# Patient Record
Sex: MALE | Race: WHITE | NOT HISPANIC OR LATINO | Employment: FULL TIME | URBAN - METROPOLITAN AREA
[De-identification: names, ages, dates, MRNs, and addresses within clinical notes are randomized per-mention and may not be internally consistent; named-entity substitution may affect disease eponyms.]

---

## 2017-01-04 ENCOUNTER — HOSPITAL ENCOUNTER (EMERGENCY)
Facility: CLINIC | Age: 27
Discharge: HOME | End: 2017-01-04
Attending: EMERGENCY MEDICINE | Admitting: EMERGENCY MEDICINE

## 2017-01-04 VITALS
TEMPERATURE: 97.5 F | WEIGHT: 310 LBS | DIASTOLIC BLOOD PRESSURE: 92 MMHG | BODY MASS INDEX: 46.98 KG/M2 | SYSTOLIC BLOOD PRESSURE: 153 MMHG | OXYGEN SATURATION: 98 % | RESPIRATION RATE: 16 BRPM | HEIGHT: 68 IN | HEART RATE: 106 BPM

## 2017-01-04 DIAGNOSIS — F17.200 CURRENT SMOKER: ICD-10-CM

## 2017-01-04 DIAGNOSIS — R00.0 SINUS TACHYCARDIA: ICD-10-CM

## 2017-01-04 DIAGNOSIS — R07.9 ACUTE CHEST PAIN: ICD-10-CM

## 2017-01-04 DIAGNOSIS — K62.5 RECTAL BLEEDING: ICD-10-CM

## 2017-01-04 PROCEDURE — 93005 ELECTROCARDIOGRAM TRACING: CPT

## 2017-01-04 PROCEDURE — 99284 EMERGENCY DEPT VISIT MOD MDM: CPT

## 2017-01-04 ASSESSMENT — ENCOUNTER SYMPTOMS
FEVER: 0
RHINORRHEA: 1
ABDOMINAL PAIN: 0
COUGH: 1
BLOOD IN STOOL: 1

## 2017-01-04 NOTE — ED NOTES
Patient complaining of mid epigastric, midsternal chest pain for two to three days.  Worse with movement or deep breath that comes and goes.  Also having bright red blood in stool.     ABCs intact.  Alert and oriented x 3.

## 2017-01-04 NOTE — LETTER
[facility] EMERGENCY DEPARTMENT  [address]  [facility] [address]  [phone]    Luan Haynes  [address] [address]  [phone] (home) [phone] (work)    : 1990      To Whom it may concern:    Luan Haynes was seen in our Emergency Department today, 2017.  He should have close follow-up for reassessment.      Sincerely,      Thelma Kaye MD

## 2017-01-04 NOTE — ED PROVIDER NOTES
"  History     Chief Complaint:  Chest Pain and Rectal Problem    HPI   Luan Haynes is a 26 year old male who presents to the emergency department today for evaluation of chest pain and a rectal problem. The patient reports that he first noticed some chest discomfort about 36 hours ago while he was sitting on the couch and playing with his girls. The patient reports that there is not specific laterality to his discomfort but just that it is in the general vicinity of his chest (points across anterior chest). He reports some exacerbation of this discomfort with deep breathing and he also reports that recently he has been coughing up brown chunks and has also had a runny nose. He reports that he is a current smoker and that sometimes when he gets coughing fits, things start to go black and \"fuzzy\" and he starts to feel tingly and his body goes numb. The patient denies any fevers, abdominal pain, or current prescription or other medications. The patient also reports some blood in his stool that he has noticed intermittently over the past several months. The patient reports that he thinks it is just a hemorrhoid and that it is not painful. He reports that he only notices the blood with bowel movements and it is not present at any other times.     Allergies:  No Known Drug Allergies    Medications:    Acetaminophen-codeine  Neurontin     Past Medical History:    Cardiovascular screening, LDL goal less than 160  Foot injury     Past Surgical History:    No past surgical history on file.    Family History:    Maternal Grandfather: HTN, Lipids, Heart Disease, Diabetes   Father: Lung cancer    Social History:  Smoking Status: Current Every Day Smoker -- 1 pack per day  Smokeless Tobacco: Current User  Alcohol Use: Positive  Marital Status:  Single [1]     Review of Systems   Constitutional: Negative for fever.   HENT: Positive for rhinorrhea.    Respiratory: Positive for cough.    Cardiovascular:        Chest discomfort " "  Gastrointestinal: Positive for blood in stool. Negative for abdominal pain.   All other systems reviewed and are negative.    Physical Exam   Vitals:  Patient Vitals for the past 24 hrs:   BP Temp Temp src Pulse Resp SpO2 Height Weight   01/04/17 1525 (!) 153/92 mmHg 97.5  F (36.4  C) Temporal 106 16 98 % 1.727 m (5' 8\") (!) 140.615 kg (310 lb)      Physical Exam  Eyes:  Sclera white; Pupils are equal and round  ENT:    External ears and nares normal, no goiter  CV:  Rate as above with regular rhythm, No murmur     No CW tenderness, no rash    No BLE edema  Resp:  Breath sounds clear and equal bilaterally  GI:  Abdomen is soft, non-tender, non-distended    No rebound tenderness or peritoneal features    Rectal: no external hemorrhoids, no blood  MS:  Moves all extremities  Skin:  Warm and dry  Neuro: Speech is normal and fluent. No apparent deficit.      Emergency Department Course     ECG:  ECG taken at 1520, ECG read at 1625  Normal sinus rhythm  Normal ECG  Rate 99 bpm. WY interval 158 ms. QRS duration 94 ms. QT/QTc 342/438 ms. P-R-T axes 76 14 34.    Emergency Department Course:  Nursing notes and vitals reviewed.  I performed an exam of the patient as documented above.   I discussed the treatment plan with the patient. The is to return to the emergency department if their symptoms persist, worsen, if new symptoms arise or if there is any concern.  All questions were answered.  I personally reviewed the EKG results with the patient and answered all related questions prior to his leaving despite not having a diagnosis..  Impression & Plan      Medical Decision Making:  Luan Haynes is her for evaluation of two areas of concern, the acute concern is chest pain with associated shortness of breath and a cough productive of what may be hemoptysis. This combination is very concerning for pulmonary embolism, especially given his associated tachycardia. He is not PERC negative and we discussed d-dimer testing prior " to obtaining imaging. The differential also includes ACS, dysrhythmia, pneumonia, bronchitis, PTx, pleural effusion. The patient has a strong family history of cardiac disease and is a current smoker. Smoking cessation is advised. He is not interested in nicotine replacement at this time. EKG demonstrated no ischemic changes but he understands that blood work would be needed to rule out NSTEMI which is also a heart attack.  Prior to blood work being drawn the patient stated that he needed to leave to get his kids. He understands that workup is not completed and diagnosis cannot be provided at this time. Several of the diagnoses under consideration can have life threatening complications. He understands the potential for disability and death related to these.     He has also had intermittent blood in the stools. He does not have associated abdominal pain to suggest that this is secondary to ulcerative colitis or inflammatory bowel disease. There is no abdominal tenderness to suggest appendicitis or diverticulitis. There are no external hemorrhoids on exam but based on his description of symptoms, I suspect likely internal hemorrhoids. I was going to check his hemoglobin to make sure that he has not dropped his blood count secondary to this and if it was unstable I was going to refer to colorectal for outpatient follow up. He understands this evaluation is also not complete.  Follow up information provided.    He understands that he can come back immediately for reassessment at any time.       Diagnosis:    ICD-10-CM    1. Acute chest pain R07.9    2. Sinus tachycardia R00.0    3. Rectal bleeding K62.5    4. Current smoker F17.200      Disposition:   The patient is discharged to home.    Scribe Disclosure:  I, Seth Boyd, am serving as a scribe at 9:32 PM on 12/19/2016 to document services personally performed by No att. providers found, based on my observations and the provider's statements to me.    1/4/2017    [facility] EMERGENCY DEPARTMENT        Thelma Kaye MD  01/04/17 2170

## 2017-01-04 NOTE — ED NOTES
This nurse went to start IV and draw labs.  Patient stated needs to leave in 50 minutes.  Was told testing would not be done by that time.  Patient stated wants to leave AMA. Dr. Kaye notified and spoke with patient.

## 2017-01-04 NOTE — ED AVS SNAPSHOT
[facility] Emergency Department    [address]    [facility] [address]    Phone:  [phone]    Fax:  [phone]                                       Luan Haynes   MRN: [id number]    Department:  [facility] Emergency Department   Date of Visit:  1/4/2017           After Visit Summary Signature Page     I have received my discharge instructions, and my questions have been answered. I have discussed any challenges I see with this plan with the nurse or doctor.    ..........................................................................................................................................  Patient/Patient Representative Signature      ..........................................................................................................................................  Patient Representative Print Name and Relationship to Patient    ..................................................               ................................................  Date                                            Time    ..........................................................................................................................................  Reviewed by Signature/Title    ...................................................              ..............................................  Date                                                            Time

## 2017-01-04 NOTE — DISCHARGE INSTRUCTIONS
Discharge Instructions  Chest Pain    You have been seen today for chest pain or discomfort.  At this time, your doctor has found no signs that your chest pain is due to a serious or life-threatening condition, (or you have declined more testing and/or admission to the hospital). However, sometimes there is a serious problem that does not show up right away. Your evaluation today may not be complete and you may need further testing and evaluation.     You need to follow-up with your regular doctor within 3 days.    Return to the Emergency Department if:    Your chest pain changes, gets worse, starts to happen more often, or comes with less activity.    You are short of breath.    You get very weak or tired.    You pass out or faint.    You have any new symptoms, like fever, cough, numb legs, or you cough up blood.    You have anything else that worries you.    Until you follow-up with your regular doctor please do the following:    Take one aspirin daily unless you have an allergy or are told not to by your doctor.    If a stress test appointment has been made, go to the appointment.    If you have questions, contact your regular doctor.    If your doctor today has told you to follow-up with your regular doctor, it is very important that you make an appointment with your clinic and go to the appointment.  If you do not follow-up with your primary doctor, it may result in missing an important development which could result in permanent injury or disability and/or lasting pain.  If there is any problem keeping your appointment, call your doctor or return to the Emergency Department.    If you were given a prescription for medicine here today, be sure to read all of the information (including the package insert) that comes with your prescription.  This will include important information about the medicine, its side effects, and any warnings that you need to know about.  The pharmacist who fills the prescription can  provide more information and answer questions you may have about the medicine.  If you have questions or concerns that the pharmacist cannot address, please call or return to the Emergency Department.       Remember that you can always come back to the Emergency Department if you are not able to see your regular doctor in the amount of time listed above, if you get any new symptoms, or if there is anything that worries you.

## 2017-01-04 NOTE — ED AVS SNAPSHOT
[facility] Emergency Department    [address]    [facility] [address]    Phone:  [phone]    Fax:  [phone]                                       Luan Haynes   MRN: [id number]    Department:  [facility] Emergency Department   Date of Visit:  1/4/2017           Patient Information     Date Of Birth          1990        Your diagnoses for this visit were:     Acute chest pain     Sinus tachycardia     Rectal bleeding     Current smoker        You were seen by Thelma Kaye MD.      Follow-up Information     Follow up with [facility] Emergency Department.    Specialty:  EMERGENCY MEDICINE    Why:  Immediately if symptoms worsen or change    Contact information:    [address]  [facility] [address] [phone]        Follow up with Colorectal.    Why:  For bleeding        Discharge Instructions       Discharge Instructions  Chest Pain    You have been seen today for chest pain or discomfort.  At this time, your doctor has found no signs that your chest pain is due to a serious or life-threatening condition, (or you have declined more testing and/or admission to the hospital). However, sometimes there is a serious problem that does not show up right away. Your evaluation today may not be complete and you may need further testing and evaluation.     You need to follow-up with your regular doctor within 3 days.    Return to the Emergency Department if:    Your chest pain changes, gets worse, starts to happen more often, or comes with less activity.    You are short of breath.    You get very weak or tired.    You pass out or faint.    You have any new symptoms, like fever, cough, numb legs, or you cough up blood.    You have anything else that worries you.    Until you follow-up with your regular doctor please do the following:    Take one aspirin daily unless you have an allergy or are told not to by your doctor.    If a  stress test appointment has been made, go to the appointment.    If you have questions, contact your regular doctor.    If your doctor today has told you to follow-up with your regular doctor, it is very important that you make an appointment with your clinic and go to the appointment.  If you do not follow-up with your primary doctor, it may result in missing an important development which could result in permanent injury or disability and/or lasting pain.  If there is any problem keeping your appointment, call your doctor or return to the Emergency Department.    If you were given a prescription for medicine here today, be sure to read all of the information (including the package insert) that comes with your prescription.  This will include important information about the medicine, its side effects, and any warnings that you need to know about.  The pharmacist who fills the prescription can provide more information and answer questions you may have about the medicine.  If you have questions or concerns that the pharmacist cannot address, please call or return to the Emergency Department.       Remember that you can always come back to the Emergency Department if you are not able to see your regular doctor in the amount of time listed above, if you get any new symptoms, or if there is anything that worries you.          24 Hour Appointment Hotline       To make an appointment at any [facility], call [phone] ([phone]). If you don't have a family doctor or clinic, we will help you find one. [place] clinics are conveniently located to serve the needs of you and your family.             Review of your medicines      Our records show that you are taking the medicines listed below. If these are incorrect, please call your family doctor or clinic.        Dose / Directions Last dose taken    acetaminophen-codeine 300-30 MG per tablet   Commonly known as:  TYLENOL w/CODEINE No. 3   Dose:  1-2 tablet    Quantity:  60 tablet        Take 1-2 tablets by mouth daily as needed for mild pain   Refills:  0        gabapentin 300 MG capsule   Commonly known as:  NEURONTIN   Quantity:  90 capsule        Take 1 tablet (300 mg) every night for 3-5 days, then 1 tablet twice daily for 3-5 days, then 1 tablet three times daily   Refills:  0        order for DME   Quantity:  1 Device        Equipment being ordered: walking boot, medium   Refills:  0                Procedures and tests performed during your visit     EKG 12 lead      Orders Needing Specimen Collection     Ordered          01/04/17 1622  CBC with platelets differential - STAT, Prio: STAT, Needs to be Collected     Scheduled Task Status   01/04/17 1622 Collect CBC with platelets differential Open   Order Class:  PCU Collect                01/04/17 1622  D dimer quantitative - STAT, Prio: STAT, Needs to be Collected     Scheduled Task Status   01/04/17 1622 Collect D dimer quantitative Open   Order Class:  PCU Collect                01/04/17 1622  INR - STAT, Prio: STAT, Needs to be Collected     Scheduled Task Status   01/04/17 1622 Collect INR Open   Order Class:  PCU Collect                01/04/17 1622  Comprehensive metabolic panel - STAT, Prio: STAT, Needs to be Collected     Scheduled Task Status   01/04/17 1622 Collect Comprehensive metabolic panel Open   Order Class:  PCU Collect                01/04/17 1622  Lipase - STAT, Prio: STAT, Needs to be Collected     Scheduled Task Status   01/04/17 1622 Collect Lipase Open   Order Class:  PCU Collect                01/04/17 1622  Troponin I - STAT, Prio: STAT, Needs to be Collected     Scheduled Task Status   01/04/17 1622 Collect Troponin I Open   Order Class:  PCU Collect                01/04/17 1622  TSH with free T4 reflex - STAT, Prio: STAT, Needs to be Collected     Scheduled Task Status   01/04/17 1623 Collect TSH with free T4 reflex Open   Order Class:  PCU Collect                  Pending Results     No  orders found from 1/3/2017 to 1/5/2017.            Pending Culture Results     No orders found from 1/3/2017 to 1/5/2017.             Test Results from your hospital stay            Clinical Quality Measure: Blood Pressure Screening     Your blood pressure was checked while you were in the emergency department today. The last reading we obtained was  BP: (!) 153/92 mmHg . Please read the guidelines below about what these numbers mean and what you should do about them.  If your systolic blood pressure (the top number) is less than 120 and your diastolic blood pressure (the bottom number) is less than 80, then your blood pressure is normal. There is nothing more that you need to do about it.  If your systolic blood pressure (the top number) is 120-139 or your diastolic blood pressure (the bottom number) is 80-89, your blood pressure may be higher than it should be. You should have your blood pressure rechecked within a year by a primary care provider.  If your systolic blood pressure (the top number) is 140 or greater or your diastolic blood pressure (the bottom number) is 90 or greater, you may have high blood pressure. High blood pressure is treatable, but if left untreated over time it can put you at risk for heart attack, stroke, or kidney failure. You should have your blood pressure rechecked by a primary care provider within the next 4 weeks.  If your provider in the emergency department today gave you specific instructions to follow-up with your doctor or provider even sooner than that, you should follow that instruction and not wait for up to 4 weeks for your follow-up visit.        Thank you for choosing [facility]       Thank you for choosing [facility] for your care. Our goal is always to provide you with excellent care. Hearing back from our patients is one way we can continue to improve our services. Please take a few minutes to complete the written survey that you may receive in the mail after you visit with  "us. Thank you!        [employer] Information     [employer] lets you send messages to your doctor, view your test results, renew your prescriptions, schedule appointments and more. To sign up, go to [email or web address] . Click on \"Log in\" on the left side of the screen, which will take you to the Welcome page. Then click on \"Sign up Now\" on the right side of the page.     You will be asked to enter the access code listed below, as well as some personal information. Please follow the directions to create your username and password.     Your access code is: [email or web address]  Expires: 2017  4:46 PM     Your access code will  in 90 days. If you need help or a new code, please call your [facility] clinic or [phone].        Care EveryWhere ID     This is your Care EveryWhere ID. This could be used by other organizations to access your [facility] medical records  [id number]        After Visit Summary       This is your record. Keep this with you and show to your community pharmacist(s) and doctor(s) at your next visit.                  "

## 2017-01-05 ENCOUNTER — HOSPITAL ENCOUNTER (EMERGENCY)
Facility: CLINIC | Age: 27
Discharge: HOME | End: 2017-01-05
Attending: EMERGENCY MEDICINE | Admitting: EMERGENCY MEDICINE

## 2017-01-05 ENCOUNTER — APPOINTMENT (OUTPATIENT)
Dept: GENERAL RADIOLOGY | Facility: CLINIC | Age: 27
End: 2017-01-05
Attending: EMERGENCY MEDICINE

## 2017-01-05 VITALS
DIASTOLIC BLOOD PRESSURE: 61 MMHG | WEIGHT: 310 LBS | HEART RATE: 110 BPM | RESPIRATION RATE: 12 BRPM | SYSTOLIC BLOOD PRESSURE: 115 MMHG | HEIGHT: 68 IN | BODY MASS INDEX: 46.98 KG/M2 | TEMPERATURE: 97.2 F | OXYGEN SATURATION: 97 %

## 2017-01-05 DIAGNOSIS — R09.1 PLEURISY: ICD-10-CM

## 2017-01-05 DIAGNOSIS — J20.9 ACUTE BRONCHITIS, UNSPECIFIED ORGANISM: ICD-10-CM

## 2017-01-05 LAB
ALBUMIN SERPL-MCNC: 3.8 G/DL (ref 3.4–5)
ALP SERPL-CCNC: 92 U/L (ref 40–150)
ALT SERPL W P-5'-P-CCNC: 46 U/L (ref 0–70)
ANION GAP SERPL CALCULATED.3IONS-SCNC: 9 MMOL/L (ref 3–14)
AST SERPL W P-5'-P-CCNC: 22 U/L (ref 0–45)
BASOPHILS # BLD AUTO: 0.1 10E9/L (ref 0–0.2)
BASOPHILS NFR BLD AUTO: 0.9 %
BILIRUB SERPL-MCNC: 0.7 MG/DL (ref 0.2–1.3)
BUN SERPL-MCNC: 13 MG/DL (ref 7–30)
CALCIUM SERPL-MCNC: 8.6 MG/DL (ref 8.5–10.1)
CHLORIDE SERPL-SCNC: 103 MMOL/L (ref 94–109)
CO2 SERPL-SCNC: 25 MMOL/L (ref 20–32)
CREAT SERPL-MCNC: 0.98 MG/DL (ref 0.66–1.25)
D DIMER PPP FEU-MCNC: NORMAL UG/ML FEU (ref 0–0.5)
DIFFERENTIAL METHOD BLD: ABNORMAL
EOSINOPHIL # BLD AUTO: 0.1 10E9/L (ref 0–0.7)
EOSINOPHIL NFR BLD AUTO: 1 %
ERYTHROCYTE [DISTWIDTH] IN BLOOD BY AUTOMATED COUNT: 12.3 % (ref 10–15)
GFR SERPL CREATININE-BSD FRML MDRD: ABNORMAL ML/MIN/1.7M2
GLUCOSE SERPL-MCNC: 101 MG/DL (ref 70–99)
HCT VFR BLD AUTO: 48.6 % (ref 40–53)
HGB BLD-MCNC: 17.1 G/DL (ref 13.3–17.7)
IMM GRANULOCYTES # BLD: 0.1 10E9/L (ref 0–0.4)
IMM GRANULOCYTES NFR BLD: 0.4 %
INTERPRETATION ECG - MUSE: NORMAL
INTERPRETATION ECG - MUSE: NORMAL
LIPASE SERPL-CCNC: 96 U/L (ref 73–393)
LYMPHOCYTES # BLD AUTO: 4.3 10E9/L (ref 0.8–5.3)
LYMPHOCYTES NFR BLD AUTO: 37.4 %
MCH RBC QN AUTO: 31.1 PG (ref 26.5–33)
MCHC RBC AUTO-ENTMCNC: 35.2 G/DL (ref 31.5–36.5)
MCV RBC AUTO: 88 FL (ref 78–100)
MONOCYTES # BLD AUTO: 1 10E9/L (ref 0–1.3)
MONOCYTES NFR BLD AUTO: 8.3 %
NEUTROPHILS # BLD AUTO: 6 10E9/L (ref 1.6–8.3)
NEUTROPHILS NFR BLD AUTO: 52 %
NRBC # BLD AUTO: 0 10*3/UL
NRBC BLD AUTO-RTO: 0 /100
PLATELET # BLD AUTO: 266 10E9/L (ref 150–450)
POTASSIUM SERPL-SCNC: 3.8 MMOL/L (ref 3.4–5.3)
PROT SERPL-MCNC: 7.7 G/DL (ref 6.8–8.8)
RBC # BLD AUTO: 5.5 10E12/L (ref 4.4–5.9)
SODIUM SERPL-SCNC: 137 MMOL/L (ref 133–144)
TROPONIN I SERPL-MCNC: NORMAL UG/L (ref 0–0.04)
WBC # BLD AUTO: 11.6 10E9/L (ref 4–11)

## 2017-01-05 PROCEDURE — 96361 HYDRATE IV INFUSION ADD-ON: CPT

## 2017-01-05 PROCEDURE — 85025 COMPLETE CBC W/AUTO DIFF WBC: CPT | Performed by: EMERGENCY MEDICINE

## 2017-01-05 PROCEDURE — 80053 COMPREHEN METABOLIC PANEL: CPT | Performed by: EMERGENCY MEDICINE

## 2017-01-05 PROCEDURE — 25000128 H RX IP 250 OP 636: Performed by: EMERGENCY MEDICINE

## 2017-01-05 PROCEDURE — 99285 EMERGENCY DEPT VISIT HI MDM: CPT | Mod: 25

## 2017-01-05 PROCEDURE — 83690 ASSAY OF LIPASE: CPT | Performed by: EMERGENCY MEDICINE

## 2017-01-05 PROCEDURE — 85379 FIBRIN DEGRADATION QUANT: CPT | Performed by: EMERGENCY MEDICINE

## 2017-01-05 PROCEDURE — 84484 ASSAY OF TROPONIN QUANT: CPT | Performed by: EMERGENCY MEDICINE

## 2017-01-05 PROCEDURE — 96374 THER/PROPH/DIAG INJ IV PUSH: CPT

## 2017-01-05 PROCEDURE — 71020 XR CHEST 2 VW: CPT

## 2017-01-05 PROCEDURE — 94640 AIRWAY INHALATION TREATMENT: CPT

## 2017-01-05 PROCEDURE — 40000275 ZZH STATISTIC RCP TIME EA 10 MIN

## 2017-01-05 PROCEDURE — 93005 ELECTROCARDIOGRAM TRACING: CPT

## 2017-01-05 PROCEDURE — 25000125 ZZHC RX 250: Performed by: EMERGENCY MEDICINE

## 2017-01-05 RX ORDER — KETOROLAC TROMETHAMINE 15 MG/ML
15 INJECTION, SOLUTION INTRAMUSCULAR; INTRAVENOUS ONCE
Status: COMPLETED | OUTPATIENT
Start: 2017-01-05 | End: 2017-01-05

## 2017-01-05 RX ORDER — IBUPROFEN 800 MG/1
800 TABLET, FILM COATED ORAL EVERY 8 HOURS PRN
Qty: 60 TABLET | Refills: 0 | Status: SHIPPED | OUTPATIENT
Start: 2017-01-05 | End: 2017-01-13

## 2017-01-05 RX ORDER — IPRATROPIUM BROMIDE AND ALBUTEROL SULFATE 2.5; .5 MG/3ML; MG/3ML
3 SOLUTION RESPIRATORY (INHALATION) ONCE
Status: COMPLETED | OUTPATIENT
Start: 2017-01-05 | End: 2017-01-05

## 2017-01-05 RX ORDER — BENZONATATE 200 MG/1
200 CAPSULE ORAL 3 TIMES DAILY PRN
Qty: 21 CAPSULE | Refills: 0 | Status: SHIPPED | OUTPATIENT
Start: 2017-01-05 | End: 2017-02-27

## 2017-01-05 RX ADMIN — SODIUM CHLORIDE 1000 ML: 9 INJECTION, SOLUTION INTRAVENOUS at 13:05

## 2017-01-05 RX ADMIN — IPRATROPIUM BROMIDE AND ALBUTEROL SULFATE 3 ML: .5; 3 SOLUTION RESPIRATORY (INHALATION) at 13:06

## 2017-01-05 RX ADMIN — KETOROLAC TROMETHAMINE 15 MG: 15 INJECTION, SOLUTION INTRAMUSCULAR; INTRAVENOUS at 13:06

## 2017-01-05 ASSESSMENT — ENCOUNTER SYMPTOMS
FEVER: 0
BLOOD IN STOOL: 1
COUGH: 1

## 2017-01-05 NOTE — DISCHARGE INSTRUCTIONS
Please make an appointment to follow up with your primary care provider in 3-5 days if not improving.    Discharge Instructions  Chest Pain    You have been seen today for chest pain or discomfort.  At this time, your doctor has found no signs that your chest pain is due to a serious or life-threatening condition, (or you have declined more testing and/or admission to the hospital). However, sometimes there is a serious problem that does not show up right away. Your evaluation today may not be complete and you may need further testing and evaluation.     You need to follow-up with your regular doctor within 3 days.    Return to the Emergency Department if:    Your chest pain changes, gets worse, starts to happen more often, or comes with less activity.    You are short of breath.    You get very weak or tired.    You pass out or faint.    You have any new symptoms, like fever, cough, numb legs, or you cough up blood.    You have anything else that worries you.    Until you follow-up with your regular doctor please do the following:    Take one aspirin daily unless you have an allergy or are told not to by your doctor.    If a stress test appointment has been made, go to the appointment.    If you have questions, contact your regular doctor.    If your doctor today has told you to follow-up with your regular doctor, it is very important that you make an appointment with your clinic and go to the appointment.  If you do not follow-up with your primary doctor, it may result in missing an important development which could result in permanent injury or disability and/or lasting pain.  If there is any problem keeping your appointment, call your doctor or return to the Emergency Department.    If you were given a prescription for medicine here today, be sure to read all of the information (including the package insert) that comes with your prescription.  This will include important information about the medicine, its side  effects, and any warnings that you need to know about.  The pharmacist who fills the prescription can provide more information and answer questions you may have about the medicine.  If you have questions or concerns that the pharmacist cannot address, please call or return to the Emergency Department.     Opioid Medication Information    Pain medications are among the most commonly prescribed medicines, so we are including this information for all our patients. If you did not receive pain medication or get a prescription for pain medicine, you can ignore it.     You may have been given a prescription for an opioid (narcotic) pain medicine and/or have received a pain medicine while here in the Emergency Department. These medicines can make you drowsy or impaired. You must not drive, operate dangerous equipment, or engage in any other dangerous activities while taking these medications. If you drive while taking these medications, you could be arrested for DUI, or driving under the influence. Do not drink any alcohol while you are taking these medications.     Opioid pain medications can cause addiction. If you have a history of chemical dependency of any type, you are at a higher risk of becoming addicted to pain medications.  Only take these prescribed medications to treat your pain when all other options have been tried. Take it for as short a time and as few doses as possible. Store your pain pills in a secure place, as they are frequently stolen and provide a dangerous opportunity for children or visitors in your house to start abusing these powerful medications. We will not replace any lost or stolen medicine.  As soon as your pain is better, you should flush all your remaining medication.     Many prescription pain medications contain Tylenol  (acetaminophen), including Vicodin , Tylenol #3 , Norco , Lortab , and Percocet .  You should not take any extra pills of Tylenol  if you are using these prescription  medications or you can get very sick.  Do not ever take more than 3000 mg of acetaminophen in any 24 hour period.    All opioids tend to cause constipation. Drink plenty of water and eat foods that have a lot of fiber, such as fruits, vegetables, prune juice, apple juice and high fiber cereal.  Take a laxative if you don t move your bowels at least every other day. Miralax , Milk of Magnesia, Colace , or Senna  can be used to keep you regular.      Remember that you can always come back to the Emergency Department if you are not able to see your regular doctor in the amount of time listed above, if you get any new symptoms, or if there is anything that worries you.          Acute Bronchitis  Your healthcare provider has told you that you have acute bronchitis. Bronchitis is infection or inflammation of the bronchial tubes (airways in the lungs). Normally, air moves easily in and out of the airways. Bronchitis narrows the airways, making it harder for air to flow in and out of the lungs. This causes symptoms such as shortness of breath, coughing, and wheezing. Bronchitis can be  acute  or  chronic.  Acute means the condition comes on quickly and goes away in a short time. Chronic means a condition lasts a long time and often comes back. Read on to learn more about acute bronchitis.    What causes acute bronchitis?  Acute bronchitis almost always starts as a viral respiratory infection, such as a cold or the flu. Certain factors make it more likely for a cold or flu to turn into bronchitis. These include being very young or very old or having a heart or lung problem. Cigarette smoking also makes bronchitis more likely.  When bronchitis develops, the airways become swollen. The airways may also become infected with bacteria. This is known as a secondary infection.  Diagnosing acute bronchitis  Your healthcare provider will examine you and ask about your symptoms and health history. You may also have a sputum culture to  test the fluid in your lungs. Chest X-rays may be done to look for infection in the lungs.  Treating acute bronchitis  Bronchitis usually clears up as the cold or flu goes away. You can help feel better faster by doing the following:    Take medicine as directed. You may be told to take ibuprofen or other over-the-counter medicines. These help relieve inflammation in your bronchial tubes. Your doctor may prescribe an inhaler to help open up the bronchial tubes. Most of the time, acute bronchitis is caused by a viral infection. Antibiotics are usually not prescribed for viral infections.    Drink plenty of fluids, such as water, juice, or warm soup. Fluids loosen mucus so that you can cough it up. This helps you breathe more easily. Fluids also prevent dehydration.    Make sure you get plenty of rest.    Do not smoke. Do not allow anyone else to smoke in your home.  Recovery and follow-up  Follow up with your doctor as you are told. You will likely feel better in a week or two. But a dry cough can linger beyond that time. Let your doctor know if you still have symptoms (other than a dry cough) after 2 weeks. If you re prone to getting bronchial infections, let your doctor know. And take steps to protect yourself from future infections. These steps include stopping smoking and avoiding tobacco smoke, washing your hands often, and getting a yearly flu shot.  When to call the doctor  Call the doctor if you have any of the following:    Fever of 100.4 F (38.0 C) higher    Symptoms that get worse, or new symptoms    Trouble breathing    Symptoms that don t start to improve within a week, or within 3 days of taking antibiotics     0493-7732 [employer] [address] [place] [address], [place] [address]. All rights reserved. This information is not intended as a substitute for professional medical care. Always follow your healthcare professional's instructions.          Pleurisy    If you have pleurisy, the lining  around your lungs is inflamed. This is most often due to a viral infection or pneumonia. It usually lasts for 10 to 14 days. It may cause sharp pain with breathing, coughing, sneezing, and movement. Antibiotics are usually not prescribed for this condition unless pneumonia is also present.  The following tips will help you care for your condition at home:    If symptoms are severe, rest at home for the first 2 to 3 days. When you resume activity, don't let yourself get too tired.    Do not smoke. Also avoid being exposed to secondhand smoke.    You may use over-the-counter medicines to control pain, unless another pain medicine was prescribed. (Note: If you have chronic liver or kidney disease or have ever had a stomach ulcer or gastrointestinal bleeding, talk with your healthcare provider before using these medicines. Also talk to your provider if you are taking medicine to prevent blood clots.) Aspirin should never be given to anyone younger than 18 years of age who is ill with a viral infection or fever. It may cause severe liver or brain damage.  Follow-up care  Follow up with your healthcare provider, or as advised.  When to seek medical advice  Call your healthcare provider right away if any of these occur:    Fever over 100.4 F (38 C)    Coughing up lots of colored sputum (mucus)    Redness, pain, or swelling of the leg  Call 911, or get immediate medical care  Contact emergency services right away if any of these occur:    Increasing shortness of breath    Increasing chest pain, or pain that spreads to the neck, arm, or back    Coughing up blood    3120-6146 [employer] [address] [place] [address], [place] [address]. All rights reserved. This information is not intended as a substitute for professional medical care. Always follow your healthcare professional's instructions.

## 2017-01-05 NOTE — LETTER
[facility] EMERGENCY DEPARTMENT  [address]  [facility] [address]  [phone]  Dept: [phone] 1/5/2017    Re: Luan Haynes      TO WHOM IT MAY CONCERN:    Luan ABHILASH Haynes  was seen on ***.  Please excuse him  until *** due to {RW WORK EXCUSE:132943}.    Cordially    No name on file.  [facility] EMERGENCY DEPARTMENT    Javid Arce MD

## 2017-01-05 NOTE — ED PROVIDER NOTES
"  History     Chief Complaint:  Chest pain    HPI   Luan Haynes is a 26 year old male with histroy of asthma when younger who presents to the emergency department today for evaluation of chest pain. Mr. Haynes reports intermittent mid-chest pain, worse on left, beginning 4 days ago while he was playing with his children. He states the pain as like a \"prick.\" Patient denies trauma or injury. He notes the pain worsens when he takes a deep breath. Of note, patient has also had chronic productive cough with brown sputum  and reports blood stool yesterday, but believes this is due hemorrhoids. Patient attempted to be seen yesterday in ED, but due to long wait times, he left before being evaluated. Pain wasn't improved prompting return here. Here, he reports the mid-sternal tenderness. No reported fever.     Cardiac/PE/DVT Risk Factors:  The patient has no history of hypertension, hyperlipidemia, diabetes, no smoking. He reports  family history of CAD. The patient has no personal of PE, DVT, or clotting disorder, but notes family history of blood clots. The patient reports no recent travel, surgery, or other immobilizations.    Allergies:  NKDA    Medications:    No daily medications.      Past Medical History:    Neuropathic pain  Crush injury, ankle  Foot injury     Past Surgical History:    Surgical history reviewed. No pertinent surgical history.     Family History:    Hypertension  Lipids  Heart disease  Diabetes     Social History:  Marital Status:  Single [1]  Tobacco: Current Everyday Smoker  Alcohol: Positive  Presents alone.      Review of Systems   Constitutional: Negative for fever.   Respiratory: Positive for cough.    Cardiovascular: Positive for chest pain.   Gastrointestinal: Positive for blood in stool.   All other systems reviewed and are negative.    Physical Exam   First Vitals:  BP: (!) 146/113 mmHg  Pulse: 110  Temp: 97.2  F (36.2  C)  Resp: 18  Height: 172.7 cm (5' 8\")  Weight: (!) 140.615 kg (310 " "lb)  SpO2: 95 %    Patient Vitals for the past 24 hrs:   BP Temp Temp src Pulse Heart Rate Resp SpO2 Height Weight   17 1445 - - - - - - 92 % - -   17 1430 115/61 mmHg - - - - - 93 % - -   17 1415 - - - - - - 96 % - -   17 1345 - - - - 100 - 95 % - -   17 1330 112/65 mmHg - - - 100 - 95 % - -   17 1315 - - - - 105 - 96 % - -   17 1306 - - - - - - 97 % - -   17 1300 111/63 mmHg - - - - - - - -   17 1245 117/68 mmHg - - - 113 - 96 % - -   17 1242 (!) 146/113 mmHg 97.2  F (36.2  C) Oral 110 - 18 95 % 1.727 m (5' 8\") (!) 140.615 kg (310 lb)     Physical Exam   HENT:   Right Ear: External ear normal.   Left Ear: External ear normal.   Nose: Nose normal.   Eyes: Conjunctivae and lids are normal.   Neck: Neck supple. No tracheal deviation present.   Cardiovascular: Regular rhythm and intact distal pulses.    tachycardia   Pulmonary/Chest: Breath sounds normal. No respiratory distress. He exhibits tenderness (+ ttp lower costal largins and R side towards axilla, no crepitus,  no palpable deformity).   Abdominal: Soft. There is no tenderness. There is no rebound and no guarding.   Musculoskeletal:   No peripheral edema   Neurological:   MAEE, no gross focal motor or sensory deficit   Skin: Skin is warm and dry. He is not diaphoretic.   Psychiatric: He has a normal mood and affect.   Nursing note and vitals reviewed.        Emergency Department Course   EC2017  12:41:52  Indication: chest pain  Findings:    Sinus tachycardia   Otherwise normal ECG..   Ventricular rate: 106 bpm  HI interval: 162 ms  QRS duration: 92 ms  QT/QTc: 334/443 ms  R-Axis: 35  ECG read at 12:50 by Dr. Arce.    Imaging:  Radiographic findings were communicated with the patient who voiced understanding of the findings.    Chest X-Ray. 2 Views:   Cardiac silhouette and pulmonary vasculature are within normal limits. No focal airspace disease, pleural effusion or pneumothorax.  Final " reading per radiology.     Laboratory:  CBC:  WBC 11.63 (H), HGB 17.1, , otherwise WNL  D dimer quantitative: <0.3 (WNL)  Troponin: <0.015 (WNL).  CMP: Glucose 101 (H) o/w WNL. (Creatinine 0.98)  Lipase: 96 (WNL)    Interventions:  13:05 Normal saline 1,000mL IV   13:06 Toradol 15 mg IV  13:06 Duoneb, 3 mL, nebulization     Emergency Department Course:  12:50 Nursing notes and vitals reviewed. I performed an exam of the patient as documented above. GCS 15.     12:41 EKG obtained in the ED, see results above.     12:52 Blood drawn.  This was sent to the lab for further testing, results above.    13:05 Patient given normal saline, Toradol, and duo neb    13:56 The patient was taken for x-ray chest, see imaging results above.     14:58  Recheck patient. Findings and plan explained to the Patient. Patient discharged home with instructions regarding supportive care, medications, and reasons to return. The importance of close follow-up was reviewed.  Impression & Plan    Medical Decision Making:  Luan Haynes is a 26 year old male with pleuritic sounding chest pain. EKG with no ischemia here. D dimer and troponin unremarkable. Abdomen benign. X-ray without pna.  Likely viral bronchitis. Discharged home with bronchitis and pleuritic chest pain. He is comfortable and agreement with that plan.     Diagnosis:    ICD-10-CM    1. Pleurisy R09.1    2. Acute bronchitis, unspecified organism J20.9        Disposition:  discharged to home    Discharge Medications:  New Prescriptions    BENZONATATE (TESSALON) 200 MG CAPSULE    Take 1 capsule (200 mg) by mouth 3 times daily as needed for cough    IBUPROFEN (ADVIL/MOTRIN) 800 MG TABLET    Take 1 tablet (800 mg) by mouth every 8 hours as needed for moderate pain       Scribe Disclosure:  CHELA, Nathaly Madrid, am serving as a scribe at 12:50 PM on 1/5/2017 to document services personally performed by Javid Arce MD, based on my observations and the provider's statements  to me.  Nathaly Madrid  1/5/2017   [facility] EMERGENCY DEPARTMENT        Javid Arce MD  01/05/17 2033

## 2017-01-05 NOTE — ED AVS SNAPSHOT
[facility] Emergency Department    [address]    [facility] [address]    Phone:  [phone]    Fax:  [phone]                                       Luan Haynes   MRN: [id number]    Department:  [facility] Emergency Department   Date of Visit:  1/5/2017           After Visit Summary Signature Page     I have received my discharge instructions, and my questions have been answered. I have discussed any challenges I see with this plan with the nurse or doctor.    ..........................................................................................................................................  Patient/Patient Representative Signature      ..........................................................................................................................................  Patient Representative Print Name and Relationship to Patient    ..................................................               ................................................  Date                                            Time    ..........................................................................................................................................  Reviewed by Signature/Title    ...................................................              ..............................................  Date                                                            Time

## 2017-01-05 NOTE — LETTER
[facility] EMERGENCY DEPARTMENT  [address]  [facility] [address]  [phone]  Dept: [phone] 1/5/2017    Re: Luan Haynes      TO WHOM IT MAY CONCERN:    Luan ABHILASH Haynes  was seen on ***.  Please excuse him  until *** due to {RW WORK EXCUSE:101030}.    Cordially    No name on file.  [facility] EMERGENCY DEPARTMENT    Javid Arce MD

## 2017-01-05 NOTE — ED AVS SNAPSHOT
[facility] Emergency Department    [address] [place] [address]    Phone:  [phone]    Fax:  [phone]                                       Luan Haynes   MRN: [id number]    Department:  [facility] Emergency Department   Date of Visit:  1/5/2017           Patient Information     Date Of Birth          1990        Your diagnoses for this visit were:     Pleurisy     Acute bronchitis, unspecified organism        You were seen by Javid Arce MD.        Discharge Instructions       Please make an appointment to follow up with your primary care provider in 3-5 days if not improving.    Discharge Instructions  Chest Pain    You have been seen today for chest pain or discomfort.  At this time, your doctor has found no signs that your chest pain is due to a serious or life-threatening condition, (or you have declined more testing and/or admission to the hospital). However, sometimes there is a serious problem that does not show up right away. Your evaluation today may not be complete and you may need further testing and evaluation.     You need to follow-up with your regular doctor within 3 days.    Return to the Emergency Department if:    Your chest pain changes, gets worse, starts to happen more often, or comes with less activity.    You are short of breath.    You get very weak or tired.    You pass out or faint.    You have any new symptoms, like fever, cough, numb legs, or you cough up blood.    You have anything else that worries you.    Until you follow-up with your regular doctor please do the following:    Take one aspirin daily unless you have an allergy or are told not to by your doctor.    If a stress test appointment has been made, go to the appointment.    If you have questions, contact your regular doctor.    If your doctor today has told you to follow-up with your regular doctor, it is very important that you make an appointment with  your clinic and go to the appointment.  If you do not follow-up with your primary doctor, it may result in missing an important development which could result in permanent injury or disability and/or lasting pain.  If there is any problem keeping your appointment, call your doctor or return to the Emergency Department.    If you were given a prescription for medicine here today, be sure to read all of the information (including the package insert) that comes with your prescription.  This will include important information about the medicine, its side effects, and any warnings that you need to know about.  The pharmacist who fills the prescription can provide more information and answer questions you may have about the medicine.  If you have questions or concerns that the pharmacist cannot address, please call or return to the Emergency Department.     Opioid Medication Information    Pain medications are among the most commonly prescribed medicines, so we are including this information for all our patients. If you did not receive pain medication or get a prescription for pain medicine, you can ignore it.     You may have been given a prescription for an opioid (narcotic) pain medicine and/or have received a pain medicine while here in the Emergency Department. These medicines can make you drowsy or impaired. You must not drive, operate dangerous equipment, or engage in any other dangerous activities while taking these medications. If you drive while taking these medications, you could be arrested for DUI, or driving under the influence. Do not drink any alcohol while you are taking these medications.     Opioid pain medications can cause addiction. If you have a history of chemical dependency of any type, you are at a higher risk of becoming addicted to pain medications.  Only take these prescribed medications to treat your pain when all other options have been tried. Take it for as short a time and as few doses as  possible. Store your pain pills in a secure place, as they are frequently stolen and provide a dangerous opportunity for children or visitors in your house to start abusing these powerful medications. We will not replace any lost or stolen medicine.  As soon as your pain is better, you should flush all your remaining medication.     Many prescription pain medications contain Tylenol  (acetaminophen), including Vicodin , Tylenol #3 , Norco , Lortab , and Percocet .  You should not take any extra pills of Tylenol  if you are using these prescription medications or you can get very sick.  Do not ever take more than 3000 mg of acetaminophen in any 24 hour period.    All opioids tend to cause constipation. Drink plenty of water and eat foods that have a lot of fiber, such as fruits, vegetables, prune juice, apple juice and high fiber cereal.  Take a laxative if you don t move your bowels at least every other day. Miralax , Milk of Magnesia, Colace , or Senna  can be used to keep you regular.      Remember that you can always come back to the Emergency Department if you are not able to see your regular doctor in the amount of time listed above, if you get any new symptoms, or if there is anything that worries you.          Acute Bronchitis  Your healthcare provider has told you that you have acute bronchitis. Bronchitis is infection or inflammation of the bronchial tubes (airways in the lungs). Normally, air moves easily in and out of the airways. Bronchitis narrows the airways, making it harder for air to flow in and out of the lungs. This causes symptoms such as shortness of breath, coughing, and wheezing. Bronchitis can be  acute  or  chronic.  Acute means the condition comes on quickly and goes away in a short time. Chronic means a condition lasts a long time and often comes back. Read on to learn more about acute bronchitis.    What causes acute bronchitis?  Acute bronchitis almost always starts as a viral  respiratory infection, such as a cold or the flu. Certain factors make it more likely for a cold or flu to turn into bronchitis. These include being very young or very old or having a heart or lung problem. Cigarette smoking also makes bronchitis more likely.  When bronchitis develops, the airways become swollen. The airways may also become infected with bacteria. This is known as a secondary infection.  Diagnosing acute bronchitis  Your healthcare provider will examine you and ask about your symptoms and health history. You may also have a sputum culture to test the fluid in your lungs. Chest X-rays may be done to look for infection in the lungs.  Treating acute bronchitis  Bronchitis usually clears up as the cold or flu goes away. You can help feel better faster by doing the following:    Take medicine as directed. You may be told to take ibuprofen or other over-the-counter medicines. These help relieve inflammation in your bronchial tubes. Your doctor may prescribe an inhaler to help open up the bronchial tubes. Most of the time, acute bronchitis is caused by a viral infection. Antibiotics are usually not prescribed for viral infections.    Drink plenty of fluids, such as water, juice, or warm soup. Fluids loosen mucus so that you can cough it up. This helps you breathe more easily. Fluids also prevent dehydration.    Make sure you get plenty of rest.    Do not smoke. Do not allow anyone else to smoke in your home.  Recovery and follow-up  Follow up with your doctor as you are told. You will likely feel better in a week or two. But a dry cough can linger beyond that time. Let your doctor know if you still have symptoms (other than a dry cough) after 2 weeks. If you re prone to getting bronchial infections, let your doctor know. And take steps to protect yourself from future infections. These steps include stopping smoking and avoiding tobacco smoke, washing your hands often, and getting a yearly flu shot.  When  to call the doctor  Call the doctor if you have any of the following:    Fever of 100.4 F (38.0 C) higher    Symptoms that get worse, or new symptoms    Trouble breathing    Symptoms that don t start to improve within a week, or within 3 days of taking antibiotics     8530-5117 [employer]. [address] [place] [id number]. All rights reserved. This information is not intended as a substitute for professional medical care. Always follow your healthcare professional's instructions.          Pleurisy    If you have pleurisy, the lining around your lungs is inflamed. This is most often due to a viral infection or pneumonia. It usually lasts for 10 to 14 days. It may cause sharp pain with breathing, coughing, sneezing, and movement. Antibiotics are usually not prescribed for this condition unless pneumonia is also present.  The following tips will help you care for your condition at home:    If symptoms are severe, rest at home for the first 2 to 3 days. When you resume activity, don't let yourself get too tired.    Do not smoke. Also avoid being exposed to secondhand smoke.    You may use over-the-counter medicines to control pain, unless another pain medicine was prescribed. (Note: If you have chronic liver or kidney disease or have ever had a stomach ulcer or gastrointestinal bleeding, talk with your healthcare provider before using these medicines. Also talk to your provider if you are taking medicine to prevent blood clots.) Aspirin should never be given to anyone younger than 18 years of age who is ill with a viral infection or fever. It may cause severe liver or brain damage.  Follow-up care  Follow up with your healthcare provider, or as advised.  When to seek medical advice  Call your healthcare provider right away if any of these occur:    Fever over 100.4 F (38 C)    Coughing up lots of colored sputum (mucus)    Redness, pain, or swelling of the leg  Call 911, or get immediate medical  care  Contact emergency services right away if any of these occur:    Increasing shortness of breath    Increasing chest pain, or pain that spreads to the neck, arm, or back    Coughing up blood    6801-2795 [employer]. [address] [place] [address], [place] [address]. All rights reserved. This information is not intended as a substitute for professional medical care. Always follow your healthcare professional's instructions.            24 Hour Appointment Hotline       To make an appointment at any [facility], call [phone] ([phone]). If you don't have a family doctor or clinic, we will help you find one. [facility] clinics are conveniently located to serve the needs of you and your family.             Review of your medicines      START taking        Dose / Directions Last dose taken    benzonatate 200 MG capsule   Commonly known as:  TESSALON   Dose:  200 mg   Quantity:  21 capsule        Take 1 capsule (200 mg) by mouth 3 times daily as needed for cough   Refills:  0        ibuprofen 800 MG tablet   Commonly known as:  ADVIL/MOTRIN   Dose:  800 mg   Quantity:  60 tablet        Take 1 tablet (800 mg) by mouth every 8 hours as needed for moderate pain   Refills:  0                Prescriptions were sent or printed at these locations (2 Prescriptions)                   Other Prescriptions                Printed at Department/Unit printer (2 of 2)         benzonatate (TESSALON) 200 MG capsule               ibuprofen (ADVIL/MOTRIN) 800 MG tablet                Procedures and tests performed during your visit     CBC with platelets differential    Comprehensive metabolic panel    D dimer quantitative    EKG 12 lead    Lipase    Troponin I    XR Chest 2 Views      Orders Needing Specimen Collection     None      Pending Results     No orders found from 1/4/2017 to 1/6/2017.            Pending Culture Results     No orders found from 1/4/2017 to 1/6/2017.       Test Results from your hospital  stay           1/5/2017  1:14 PM - Interface, Flexilab Results      Component Results     Component Value Ref Range & Units Status    WBC 11.6 (H) 4.0 - 11.0 10e9/L Final    RBC Count 5.50 4.4 - 5.9 10e12/L Final    Hemoglobin 17.1 13.3 - 17.7 g/dL Final    Hematocrit 48.6 40.0 - 53.0 % Final    MCV 88 78 - 100 fl Final    MCH 31.1 26.5 - 33.0 pg Final    MCHC 35.2 31.5 - 36.5 g/dL Final    RDW 12.3 10.0 - 15.0 % Final    Platelet Count 266 150 - 450 10e9/L Final    Diff Method Automated Method  Final    % Neutrophils 52.0 % Final    % Lymphocytes 37.4 % Final    % Monocytes 8.3 % Final    % Eosinophils 1.0 % Final    % Basophils 0.9 % Final    % Immature Granulocytes 0.4 % Final    Nucleated RBCs 0 0 /100 Final    Absolute Neutrophil 6.0 1.6 - 8.3 10e9/L Final    Absolute Lymphocytes 4.3 0.8 - 5.3 10e9/L Final    Absolute Monocytes 1.0 0.0 - 1.3 10e9/L Final    Absolute Eosinophils 0.1 0.0 - 0.7 10e9/L Final    Absolute Basophils 0.1 0.0 - 0.2 10e9/L Final    Abs Immature Granulocytes 0.1 0 - 0.4 10e9/L Final    Absolute Nucleated RBC 0.0  Final         1/5/2017  1:26 PM - Interface, Flexilab Results      Component Results     Component Value Ref Range & Units Status    D Dimer  0.0 - 0.50 ug/ml FEU Final    <0.3  This D-dimer assay is intended for use in conjuntion with a clinical pretest   probability assessment model to exclude pulmonary embolism (PE) and as an aid   in the diagnosis of deep venous thrombosis (DVT) in outpatients suspected of PE   or DVT. The cut-off value is 0.5 g/mL FEU.           1/5/2017  1:40 PM - Interface, Flexilab Results      Component Results     Component Value Ref Range & Units Status    Sodium 137 133 - 144 mmol/L Final    Potassium 3.8 3.4 - 5.3 mmol/L Final    Chloride 103 94 - 109 mmol/L Final    Carbon Dioxide 25 20 - 32 mmol/L Final    Anion Gap 9 3 - 14 mmol/L Final    Glucose 101 (H) 70 - 99 mg/dL Final    Urea Nitrogen 13 7 - 30 mg/dL Final    Creatinine 0.98 0.66 - 1.25  mg/dL Final    GFR Estimate >90  Non  GFR Calc   >60 mL/min/1.7m2 Final    GFR Estimate If Black >90   GFR Calc   >60 mL/min/1.7m2 Final    Calcium 8.6 8.5 - 10.1 mg/dL Final    Bilirubin Total 0.7 0.2 - 1.3 mg/dL Final    Albumin 3.8 3.4 - 5.0 g/dL Final    Protein Total 7.7 6.8 - 8.8 g/dL Final    Alkaline Phosphatase 92 40 - 150 U/L Final    ALT 46 0 - 70 U/L Final    AST 22 0 - 45 U/L Final         1/5/2017  1:41 PM - Interface, Flexilab Results      Component Results     Component Value Ref Range & Units Status    Troponin I ES  0.000 - 0.045 ug/L Final    <0.015  The 99th percentile for upper reference range is 0.045 ug/L.  Troponin values in   the range of 0.045 - 0.120 ug/L may be associated with risks of adverse   clinical events.           1/5/2017  1:40 PM - Interface, Flexilab Results      Component Results     Component Value Ref Range & Units Status    Lipase 96 73 - 393 U/L Final         1/5/2017  2:07 PM - Interface, Radiant Ib      Narrative     XR CHEST 2 VW 1/5/2017 2:03 PM    COMPARISON: None.    HISTORY: Cough        Impression     IMPRESSION: Cardiac silhouette and pulmonary vasculature are within  normal limits. No focal airspace disease, pleural effusion or  pneumothorax.    GENNA JORDAN                Clinical Quality Measure: Blood Pressure Screening     Your blood pressure was checked while you were in the emergency department today. The last reading we obtained was  BP: 115/61 mmHg . Please read the guidelines below about what these numbers mean and what you should do about them.  If your systolic blood pressure (the top number) is less than 120 and your diastolic blood pressure (the bottom number) is less than 80, then your blood pressure is normal. There is nothing more that you need to do about it.  If your systolic blood pressure (the top number) is 120-139 or your diastolic blood pressure (the bottom number) is 80-89, your blood pressure may be higher  "than it should be. You should have your blood pressure rechecked within a year by a primary care provider.  If your systolic blood pressure (the top number) is 140 or greater or your diastolic blood pressure (the bottom number) is 90 or greater, you may have high blood pressure. High blood pressure is treatable, but if left untreated over time it can put you at risk for heart attack, stroke, or kidney failure. You should have your blood pressure rechecked by a primary care provider within the next 4 weeks.  If your provider in the emergency department today gave you specific instructions to follow-up with your doctor or provider even sooner than that, you should follow that instruction and not wait for up to 4 weeks for your follow-up visit.        Thank you for choosing [facility]       Thank you for choosing [facility] for your care. Our goal is always to provide you with excellent care. Hearing back from our patients is one way we can continue to improve our services. Please take a few minutes to complete the written survey that you may receive in the mail after you visit with us. Thank you!        [employer] Information     [employer] lets you send messages to your doctor, view your test results, renew your prescriptions, schedule appointments and more. To sign up, go to [email or web address] . Click on \"Log in\" on the left side of the screen, which will take you to the Welcome page. Then click on \"Sign up Now\" on the right side of the page.     You will be asked to enter the access code listed below, as well as some personal information. Please follow the directions to create your username and password.     Your access code is: [email or web address]  Expires: 2017  4:46 PM     Your access code will  in 90 days. If you need help or a new code, please call your [facility] clinic or [phone].        Care EveryWhere ID     This is your Care EveryWhere ID. This could be used by other organizations to access your [facility] " medical records  [id number]        After Visit Summary       This is your record. Keep this with you and show to your community pharmacist(s) and doctor(s) at your next visit.

## 2017-01-05 NOTE — ED NOTES
Pt presents to ED reporting left sided chest pain that began four days ago while playing with his kids, states it is worse when he takes a deep breath. Pt reports he attempted to get seen yesterday but left prior due to wait times in ED. Denies cardiac hx, reports strong familial hx, pt does endorse a cough that he always has. ABCs intact. A/Ox3

## 2017-01-05 NOTE — LETTER
[facility] EMERGENCY DEPARTMENT  [address]  [facility] [address]  [phone]  [phone]      1/5/2017    Re: Luan Haynes      TO WHOM IT MAY CONCERN:    Luan Haynes was seen on 1/5/2017.  Please excuse from work until 1/9/17 due to illness.      Sincerely,          Javid Arce MD  [facility]  [facility]

## 2017-02-27 ENCOUNTER — APPOINTMENT (OUTPATIENT)
Dept: GENERAL RADIOLOGY | Facility: CLINIC | Age: 27
End: 2017-02-27
Attending: EMERGENCY MEDICINE

## 2017-02-27 ENCOUNTER — HOSPITAL ENCOUNTER (EMERGENCY)
Facility: CLINIC | Age: 27
Discharge: HOME | End: 2017-02-27
Attending: EMERGENCY MEDICINE | Admitting: EMERGENCY MEDICINE

## 2017-02-27 VITALS
WEIGHT: 285 LBS | RESPIRATION RATE: 20 BRPM | HEIGHT: 68 IN | SYSTOLIC BLOOD PRESSURE: 122 MMHG | TEMPERATURE: 96.5 F | HEART RATE: 127 BPM | OXYGEN SATURATION: 97 % | BODY MASS INDEX: 43.19 KG/M2 | DIASTOLIC BLOOD PRESSURE: 75 MMHG

## 2017-02-27 DIAGNOSIS — B34.9 VIRAL SYNDROME: ICD-10-CM

## 2017-02-27 DIAGNOSIS — R04.2 HEMOPTYSIS: ICD-10-CM

## 2017-02-27 DIAGNOSIS — R51.9 NONINTRACTABLE HEADACHE, UNSPECIFIED CHRONICITY PATTERN, UNSPECIFIED HEADACHE TYPE: ICD-10-CM

## 2017-02-27 LAB
ANION GAP SERPL CALCULATED.3IONS-SCNC: 11 MMOL/L (ref 3–14)
BASOPHILS # BLD AUTO: 0.1 10E9/L (ref 0–0.2)
BASOPHILS NFR BLD AUTO: 0.4 %
BUN SERPL-MCNC: 14 MG/DL (ref 7–30)
CALCIUM SERPL-MCNC: 8.6 MG/DL (ref 8.5–10.1)
CHLORIDE SERPL-SCNC: 101 MMOL/L (ref 94–109)
CO2 SERPL-SCNC: 23 MMOL/L (ref 20–32)
CREAT SERPL-MCNC: 0.99 MG/DL (ref 0.66–1.25)
DEPRECATED S PYO AG THROAT QL EIA: NORMAL
DIFFERENTIAL METHOD BLD: ABNORMAL
EOSINOPHIL # BLD AUTO: 0 10E9/L (ref 0–0.7)
EOSINOPHIL NFR BLD AUTO: 0.2 %
ERYTHROCYTE [DISTWIDTH] IN BLOOD BY AUTOMATED COUNT: 13.1 % (ref 10–15)
FLUAV+FLUBV AG SPEC QL: NEGATIVE
FLUAV+FLUBV AG SPEC QL: NORMAL
GFR SERPL CREATININE-BSD FRML MDRD: ABNORMAL ML/MIN/1.7M2
GLUCOSE SERPL-MCNC: 103 MG/DL (ref 70–99)
HCT VFR BLD AUTO: 48.2 % (ref 40–53)
HGB BLD-MCNC: 16.6 G/DL (ref 13.3–17.7)
IMM GRANULOCYTES # BLD: 0.1 10E9/L (ref 0–0.4)
IMM GRANULOCYTES NFR BLD: 0.5 %
LYMPHOCYTES # BLD AUTO: 2.4 10E9/L (ref 0.8–5.3)
LYMPHOCYTES NFR BLD AUTO: 12.7 %
MCH RBC QN AUTO: 30.6 PG (ref 26.5–33)
MCHC RBC AUTO-ENTMCNC: 34.4 G/DL (ref 31.5–36.5)
MCV RBC AUTO: 89 FL (ref 78–100)
MICRO REPORT STATUS: NORMAL
MONOCYTES # BLD AUTO: 2.2 10E9/L (ref 0–1.3)
MONOCYTES NFR BLD AUTO: 11.5 %
NEUTROPHILS # BLD AUTO: 14.1 10E9/L (ref 1.6–8.3)
NEUTROPHILS NFR BLD AUTO: 74.7 %
NRBC # BLD AUTO: 0 10*3/UL
NRBC BLD AUTO-RTO: 0 /100
PLATELET # BLD AUTO: 232 10E9/L (ref 150–450)
POTASSIUM SERPL-SCNC: 4 MMOL/L (ref 3.4–5.3)
RBC # BLD AUTO: 5.42 10E12/L (ref 4.4–5.9)
SODIUM SERPL-SCNC: 135 MMOL/L (ref 133–144)
SPECIMEN SOURCE: NORMAL
SPECIMEN SOURCE: NORMAL
WBC # BLD AUTO: 18.9 10E9/L (ref 4–11)

## 2017-02-27 PROCEDURE — 87081 CULTURE SCREEN ONLY: CPT | Performed by: EMERGENCY MEDICINE

## 2017-02-27 PROCEDURE — 99284 EMERGENCY DEPT VISIT MOD MDM: CPT | Mod: 25

## 2017-02-27 PROCEDURE — 25000128 H RX IP 250 OP 636: Performed by: EMERGENCY MEDICINE

## 2017-02-27 PROCEDURE — 25000308 HC RX OP HPI UCR WEL MED 250 IP 250: Performed by: EMERGENCY MEDICINE

## 2017-02-27 PROCEDURE — 87880 STREP A ASSAY W/OPTIC: CPT | Performed by: EMERGENCY MEDICINE

## 2017-02-27 PROCEDURE — 80048 BASIC METABOLIC PNL TOTAL CA: CPT | Performed by: EMERGENCY MEDICINE

## 2017-02-27 PROCEDURE — 96361 HYDRATE IV INFUSION ADD-ON: CPT

## 2017-02-27 PROCEDURE — 85025 COMPLETE CBC W/AUTO DIFF WBC: CPT | Performed by: EMERGENCY MEDICINE

## 2017-02-27 PROCEDURE — 71020 XR CHEST 2 VW: CPT

## 2017-02-27 PROCEDURE — 87804 INFLUENZA ASSAY W/OPTIC: CPT | Performed by: EMERGENCY MEDICINE

## 2017-02-27 PROCEDURE — 96374 THER/PROPH/DIAG INJ IV PUSH: CPT

## 2017-02-27 RX ORDER — LIDOCAINE HYDROCHLORIDE 40 MG/ML
1 INJECTION, SOLUTION RETROBULBAR ONCE
Status: COMPLETED | OUTPATIENT
Start: 2017-02-27 | End: 2017-02-27

## 2017-02-27 RX ORDER — KETOROLAC TROMETHAMINE 30 MG/ML
30 INJECTION, SOLUTION INTRAMUSCULAR; INTRAVENOUS ONCE
Status: COMPLETED | OUTPATIENT
Start: 2017-02-27 | End: 2017-02-27

## 2017-02-27 RX ORDER — BENZONATATE 100 MG/1
100 CAPSULE ORAL 3 TIMES DAILY PRN
Qty: 42 CAPSULE | Refills: 0 | Status: SHIPPED | OUTPATIENT
Start: 2017-02-27 | End: 2018-12-05

## 2017-02-27 RX ORDER — SODIUM CHLORIDE 9 MG/ML
1000 INJECTION, SOLUTION INTRAVENOUS CONTINUOUS
Status: DISCONTINUED | OUTPATIENT
Start: 2017-02-27 | End: 2017-02-27 | Stop reason: HOSPADM

## 2017-02-27 RX ORDER — ALBUTEROL SULFATE 90 UG/1
2 AEROSOL, METERED RESPIRATORY (INHALATION) EVERY 4 HOURS PRN
Qty: 1 INHALER | Refills: 0 | Status: SHIPPED | OUTPATIENT
Start: 2017-02-27 | End: 2018-12-05

## 2017-02-27 RX ADMIN — KETOROLAC TROMETHAMINE 30 MG: 30 INJECTION, SOLUTION INTRAMUSCULAR at 07:37

## 2017-02-27 RX ADMIN — LIDOCAINE HYDROCHLORIDE 1 ML: 40 INJECTION, SOLUTION RETROBULBAR; TOPICAL at 07:40

## 2017-02-27 RX ADMIN — SODIUM CHLORIDE 1000 ML: 9 INJECTION, SOLUTION INTRAVENOUS at 06:45

## 2017-02-27 ASSESSMENT — ENCOUNTER SYMPTOMS
VOMITING: 0
DIARRHEA: 1
HEADACHES: 1
SORE THROAT: 1
MYALGIAS: 1
BACK PAIN: 0
FEVER: 0
CHILLS: 0
COUGH: 1
NAUSEA: 1
ABDOMINAL PAIN: 0

## 2017-02-27 NOTE — DISCHARGE INSTRUCTIONS
You have a small mass in your lungs. Likely normal blood vessels. Please follow up with your doctor on this    Discharge Instructions  Headache    You were seen today for a headache. Headaches may be caused by many different things such as muscle tension, sinus inflammation, anxiety and stress, having too little sleep, too much alcohol, some medical conditions or injury. You may have a migraine, which is caused by changes in the blood vessels in your head.  At this time your doctor does not find that your headache is a sign of anything dangerous or life-threatening.  However, sometimes the signs of serious illness do not show up right away.  If you have new or worse symptoms, you may need to be seen again in the emergency department or by your primary doctor.      Return to the Emergency Department if:    You get a fever of 101 F or higher.    Your headache gets much worse.    You get a stiff neck with your headache.    You get a new headache that is different or worse than headaches you have had before.    You are vomiting and can t keep food or water down.    You have blurry or double vision or other problems with your eyes.    You have a new weakness on one side of your body.    You have difficulty with balance which is new.    You or your family thinks you are confused.    You have a seizure or convulsion.    What can I do to help myself?    Pain medications - You may take a pain medication such as Tylenol  (acetaminophen), Advil , Nuprin  (ibuprofen) or Aleve  (naproxen).  If you have been given a narcotic such as Vicodin  (hydrocodone with acetaminophen), Percocet  (oxycodone with acetaminophen), codeine, or a muscle relaxant such as Flexeril  (cyclobenzaprine) or Soma  (carisoprodol), do not drive for four hours after you have taken it. If the narcotic contains Tylenol  (acetaminophen), do not take Tylenol  with it. All narcotics will cause constipation, so eat a high fiber diet.        Take a pain reliever  as soon as you notice symptoms.  Starting medications as soon as you start to have symptoms may lessen the amount of pain you have.    Relaxing in a quiet, dark room may help.    Get enough sleep and eat meals regularly.    Schedule an appointment with your primary physician as instructed, or at least within 1 week.    You may need to watch for certain foods or other things which may trigger your headaches.  Keeping a journal of your headaches and possible triggers may help you and your primary doctor to identify things which you should avoid which may be causing your headaches.  If you were given a prescription for medicine here today, be sure to read all of the information (including the package insert) that comes with your prescription.  This will include important information about the medicine, its side effects, and any warnings that you need to know about.  The pharmacist who fills the prescription can provide more information and answer questions you may have about the medicine.  If you have questions or concerns that the pharmacist cannot address, please call or return to the Emergency Department.       Remember that you can always come back to the Emergency Department if you are not able to see your regular doctor in the amount of time listed above, if you get any new symptoms, or if there is anything that worries you.      Discharge Instructions  Upper Respiratory Infection    The upper respiratory tract includes the sinuses, nasal passages, pharynx, and larynx. A URI, or upper respiratory infection, is an infection of any of the parts of the upper airway. Symptoms include runny nose, congestion, sore throat, cough, and fever. URIs are almost always caused by a virus. Antibiotics do not help with virus infections, so are not used for an ordinary URI. A URI is very contagious through coughing and nasal secretions; make sure you wash your hands often and clean surfaces after sneezing, coughing or touching  them.  Viruses can live on surfaces for up to 3 days.      Return to the Emergency Department if:    Any of the symptoms you have get much worse.    You seem very sick, like being too weak to get up.    You have any new symptoms, especially serious things like chest pain.     You are short of breath.     You have a severe headache.    You are vomiting so much you can t keep fluids or medicines down.    You have confusion or seem unusually drowsy.    You have a seizure or convulsion.    Follow-up:      You should start to improve in 3 - 5 days.  A cough can linger for up to six weeks, but overall you should be feeling much better.  See your doctor if you have a fever for more than 3 days, or if you are not feeling better within 5 days.      What can I do to help myself?    Fill any prescriptions the doctor gave you and take them right away    If you have a fever, get plenty of rest and drink lots of fluids, especially water. Using a humidifier or saline nose spray will also help loosen secretions.     What clothes or blankets you have on won t change your fever. Do what is comfortable for you.    Bathing or sponging in lukewarm water may help you feel better.    Tylenol  (acetaminophen), Motrin  (ibuprofen), or Advil  (ibuprofen) help bring fever down and may help you feel more comfortable. Be sure to read and follow the package directions, and ask your doctor if you have questions.    Do not drink alcohol.    Decongestants may help you feel better. You may use decongestant nose sprays Afrin  (oxymetazoline) or Fermin-Synephrine  (phenylephrine hydrochloride) for up to 3 days, or may use a decongestant tablet like Sudafed  (pseudoephedrine).  If you were given a prescription for medicine here today, be sure to read all of the information (including the package insert) that comes with your prescription.  This will include important information about the medicine, its side effects, and any warnings that you need to know  about.  The pharmacist who fills the prescription can provide more information and answer questions you may have about the medicine.  If you have questions or concerns that the pharmacist cannot address, please call or return to the Emergency Department.         Remember that you can always come back to the Emergency Department if you are not able to see your regular doctor in the amount of time listed above, if you get any new symptoms, or if there is anything that worries you.          Hemoptysis    Hemoptysis is the medical term for coughing up blood. There are many causes for this, including minor illnesses like bronchitis. Hemoptysis can also be an early sign of a more serious illness, like a blood clot in the lung (pulmonary embolism), cancer, tuberculosis, or pneumonia.  Less common causes of hemoptysis can be hard to diagnose in an emergency department or a clinic. More testing will be needed if the symptoms continue.  Home care    Stay away from cigarette smoke. Smoke irritates the bronchial passages.    Unless you are taking daily aspirin to prevent stroke or heart attack, do not take aspirin or products that contain aspirin. Aspirin affects how readily the blood clots. Medicines that prevent clotting may make hemoptysis worse.    If you have a lung infection, drinking extra fluid will help loosen secretions in the lungs.    Over-the-counter cough medicines that contain dextromethorphan may help reduce coughing. Check with a medical provider before taking dextromethorphan if you have a chronic illness, are pregnant, or take daily medications.    If you were prescribed an antibiotic, take it until it is all gone. Take it even if you are feeling better after only a few days.  Follow-up care  Follow up with your health care provider, or as advised.  When to seek medical advice  Call your healthcare provider right away if any of these occur:    Fever of 100.4 F (38 C) or higher  Call 911, or get immediate  medical care  Call emergency services right away if any of these occur:    Coughing up an increased amount of blood    Trouble breathing, wheezing, or pain with breathing    Chest pain or chest pressure    Fainting or losing consciousness    Rapid heartbeat    Weakness or dizziness    [id number]. [address] [place] [address]. All rights reserved. This information is not intended as a substitute for professional medical care. Always follow your healthcare professional's instructions.

## 2017-02-27 NOTE — ED PROVIDER NOTES
"  History     Chief Complaint:  Headache    HPI    Luan Haynes is a 26 year old male who presents with headache, myalgias, productive cough, diarrhea. The patient states that he normally has a persistent occasional cough at baseline but over the past 3 days he has developed a worsening productive cough, body aches, sinus pressure and diarrhea. He notes that his headache is not typical in the sense that it is constantly present, and only is provoked when he has a bout of coughing - describing it as feeling like an intense pressure when he does. His constellation of symptoms were concerning for flu and today he says he coughed up \"blood or tissue\" this morning and this prompted him to come here for evaluation. He otherwise denies associated fevers or chills. He has no nausea, vomiting, abdominal pain, chest pain.     Allergies:  No known drug allergies     Medications:    Tessalon     Past Medical History:    Neuropathic pain  Tibial tendinitis  Foot injury    Past Surgical History:    Appendectomy     Family History:    History reviewed. No pertinent family history.      Social History:  Smoking status: Current smoker  Alcohol use: Yes, seldom   Marital Status:  Single      Review of Systems   Constitutional: Negative for chills and fever.   HENT: Positive for sore throat.    Respiratory: Positive for cough.    Cardiovascular: Negative for chest pain.   Gastrointestinal: Positive for diarrhea and nausea. Negative for abdominal pain and vomiting.   Musculoskeletal: Positive for myalgias. Negative for back pain.   Neurological: Positive for headaches.   All other systems reviewed and are negative.      Physical Exam   First Vitals:  BP: 132/90  Pulse: 127  Temp: 96.5  F (35.8  C)  Resp: 20  Height: 172.7 cm (5' 8\")  Weight: 129.3 kg (285 lb)  SpO2: 100 %      Physical Exam  Constitutional:  Oriented to person, place, and time. Appears well-developed.      No distress. Well appearing.  HENT:    TMs clear  Head: "    Normocephalic.   Mouth/Throat:   Oropharynx is clear and moist. Mild erythema. No exudate or uvular deviation.  Eyes:    EOM are normal. Pupils are equal, round, and reactive to light.   Neck:    Neck supple.   Cardiovascular:  Normal rate, regular rhythm and normal heart sounds.      Exam reveals no gallop and no friction rub.       No murmur heard.  Pulmonary/Chest:  Effort normal and breath sounds normal.      No respiratory distress. No wheezes. No rales.   Abdominal:   Soft. No distension. No tenderness. No rebound and no guarding.   Musculoskeletal:  Normal range of motion.   Neurological:   Alert and oriented to person, place, and time.           Moves all 4 extremities spontaneously       No meningeal signs.   Skin:    No rash noted. No pallor.     Emergency Department Course     Imaging:  Radiographic findings were communicated with the patient who voiced understanding of the findings.    X-ray Chest, 2 views:  No significant change. No acute infiltrate or pleural  effusion . Heart and pulmonary vessels within normal limits. Stable  appearance of the mediastinum. Some right paratracheal density is  stable, nonspecific but may be brachiocephalic vessels .  Result per radiology.     Laboratory:  CBC: WBC 18.9 (H), o/w WNL (HGB 16.6, )    BMP: Glucose 103 (H), o/w WNL (Creatinine 0.99)   Influenza A/B: Negative  Rapid Strep: Negative  Strep Culture: Pending     Interventions:  0645 - NS 1L IV Bolus   0737 - Toradol 30 mg IV  0740 - NS 1L IV Bolus     Emergency Department Course:  Past medical records, nursing notes, and vitals reviewed.  0717: I performed an exam of the patient and obtained history, as documented above.    IV inserted and blood drawn.   The patient was sent for a X-ray while in the emergency department, findings above.   I rechecked the patient. Findings and plan explained to the Patient. Patient discharged home with instructions regarding supportive care, medications, and reasons to  return. The importance of close follow-up was reviewed.        Impression & Plan      Medical Decision Making:  Luan Haynes is a 26 year old male who presents for evaluation of cough, headache, and myalgias over the past 3 days and he was complaining of hemoptysis; this does appear to be mild.  Blood work was obtained which is revealing for a leukocytosis but otherwise unremarkable chemistry panel, negative influenza, and negative strep. His chest X-ray otherwise does not show pneumonia or obvious cancerous mass. He has a small paratracheal density that is likely incidental and may be related to bracheo-cephalic vessels. I discussed this with him and discussed follow up for a repeat chest X-ray, or to return for increasing hemoptysis. Close followup of primary care physician is indicated and return to the ED for high fevers > 103 for more than 48 hours more, increasing productive cough, shortness of breath, or confusion.  There is no signs of serious bacterial infection such as bacteremia, meningitis, UTI/pyelonephritis, strep pharyngitis, etc.      Diagnosis:    ICD-10-CM    1. Nonintractable headache, unspecified chronicity pattern, unspecified headache type R51    2. Hemoptysis R04.2    3. Viral syndrome B34.9      Discharge Medications:   Details   albuterol (ALBUTEROL) 108 (90 BASE) MCG/ACT Inhaler Inhale 2 puffs into the lungs every 4 hours as needed for shortness of breath / dyspnea, Disp-1 Inhaler, R-0, Local Print       Herman Lazar  2/27/2017   [facility] EMERGENCY DEPARTMENT  IHerman, am serving as a scribe at 7:17 AM on 2/27/2017 to document services personally performed by Jason Stephens MD based on my observations and the provider's statements to me.       Jason Stephens MD  02/27/17 1019

## 2017-02-27 NOTE — ED AVS SNAPSHOT
[facility]    [address]    [facility] [address]    Phone:  [phone]    Fax:  [phone]                                       Luan Haynes   MRN: [id number]    Department:  [facility] Emergency [facility]   Date of Visit:  2/27/2017           After Visit Summary Signature Page     I have received my discharge instructions, and my questions have been answered. I have discussed any challenges I see with this plan with the nurse or doctor.    ..........................................................................................................................................  Patient/Patient Representative Signature      ..........................................................................................................................................  Patient Representative Print Name and Relationship to Patient    ..................................................               ................................................  Date                                            Time    ..........................................................................................................................................  Reviewed by Signature/Title    ...................................................              ..............................................  Date                                                            Time

## 2017-02-27 NOTE — ED AVS SNAPSHOT
[facility] Emergency Department    [address]    [facility] [address]    Phone:  [phone]    Fax:  [phone]                                       Luan Haynes   MRN: [id number]    Department:  [facility] Emergency Department   Date of Visit:  2/27/2017           Patient Information     Date Of Birth          1990        Your diagnoses for this visit were:     Nonintractable headache, unspecified chronicity pattern, unspecified headache type     Hemoptysis     Viral syndrome        You were seen by Jason Stephens MD.      Follow-up Information     Follow up with No Ref-Primary, Physician. Call in 1 week.        Discharge Instructions       You have a small mass in your lungs. Likely normal blood vessels. Please follow up with your doctor on this    Discharge Instructions  Headache    You were seen today for a headache. Headaches may be caused by many different things such as muscle tension, sinus inflammation, anxiety and stress, having too little sleep, too much alcohol, some medical conditions or injury. You may have a migraine, which is caused by changes in the blood vessels in your head.  At this time your doctor does not find that your headache is a sign of anything dangerous or life-threatening.  However, sometimes the signs of serious illness do not show up right away.  If you have new or worse symptoms, you may need to be seen again in the emergency department or by your primary doctor.      Return to the Emergency Department if:    You get a fever of 101 F or higher.    Your headache gets much worse.    You get a stiff neck with your headache.    You get a new headache that is different or worse than headaches you have had before.    You are vomiting and can t keep food or water down.    You have blurry or double vision or other problems with your eyes.    You have a new weakness on one side of your body.    You have difficulty with balance which is  new.    You or your family thinks you are confused.    You have a seizure or convulsion.    What can I do to help myself?    Pain medications - You may take a pain medication such as Tylenol  (acetaminophen), Advil , Nuprin  (ibuprofen) or Aleve  (naproxen).  If you have been given a narcotic such as Vicodin  (hydrocodone with acetaminophen), Percocet  (oxycodone with acetaminophen), codeine, or a muscle relaxant such as Flexeril  (cyclobenzaprine) or Soma  (carisoprodol), do not drive for four hours after you have taken it. If the narcotic contains Tylenol  (acetaminophen), do not take Tylenol  with it. All narcotics will cause constipation, so eat a high fiber diet.        Take a pain reliever as soon as you notice symptoms.  Starting medications as soon as you start to have symptoms may lessen the amount of pain you have.    Relaxing in a quiet, dark room may help.    Get enough sleep and eat meals regularly.    Schedule an appointment with your primary physician as instructed, or at least within 1 week.    You may need to watch for certain foods or other things which may trigger your headaches.  Keeping a journal of your headaches and possible triggers may help you and your primary doctor to identify things which you should avoid which may be causing your headaches.  If you were given a prescription for medicine here today, be sure to read all of the information (including the package insert) that comes with your prescription.  This will include important information about the medicine, its side effects, and any warnings that you need to know about.  The pharmacist who fills the prescription can provide more information and answer questions you may have about the medicine.  If you have questions or concerns that the pharmacist cannot address, please call or return to the Emergency Department.       Remember that you can always come back to the Emergency Department if you are not able to see your regular doctor  in the amount of time listed above, if you get any new symptoms, or if there is anything that worries you.      Discharge Instructions  Upper Respiratory Infection    The upper respiratory tract includes the sinuses, nasal passages, pharynx, and larynx. A URI, or upper respiratory infection, is an infection of any of the parts of the upper airway. Symptoms include runny nose, congestion, sore throat, cough, and fever. URIs are almost always caused by a virus. Antibiotics do not help with virus infections, so are not used for an ordinary URI. A URI is very contagious through coughing and nasal secretions; make sure you wash your hands often and clean surfaces after sneezing, coughing or touching them.  Viruses can live on surfaces for up to 3 days.      Return to the Emergency Department if:    Any of the symptoms you have get much worse.    You seem very sick, like being too weak to get up.    You have any new symptoms, especially serious things like chest pain.     You are short of breath.     You have a severe headache.    You are vomiting so much you can t keep fluids or medicines down.    You have confusion or seem unusually drowsy.    You have a seizure or convulsion.    Follow-up:      You should start to improve in 3 - 5 days.  A cough can linger for up to six weeks, but overall you should be feeling much better.  See your doctor if you have a fever for more than 3 days, or if you are not feeling better within 5 days.      What can I do to help myself?    Fill any prescriptions the doctor gave you and take them right away    If you have a fever, get plenty of rest and drink lots of fluids, especially water. Using a humidifier or saline nose spray will also help loosen secretions.     What clothes or blankets you have on won t change your fever. Do what is comfortable for you.    Bathing or sponging in lukewarm water may help you feel better.    Tylenol  (acetaminophen), Motrin  (ibuprofen), or Advil   (ibuprofen) help bring fever down and may help you feel more comfortable. Be sure to read and follow the package directions, and ask your doctor if you have questions.    Do not drink alcohol.    Decongestants may help you feel better. You may use decongestant nose sprays Afrin  (oxymetazoline) or Fermin-Synephrine  (phenylephrine hydrochloride) for up to 3 days, or may use a decongestant tablet like Sudafed  (pseudoephedrine).  If you were given a prescription for medicine here today, be sure to read all of the information (including the package insert) that comes with your prescription.  This will include important information about the medicine, its side effects, and any warnings that you need to know about.  The pharmacist who fills the prescription can provide more information and answer questions you may have about the medicine.  If you have questions or concerns that the pharmacist cannot address, please call or return to the Emergency Department.         Remember that you can always come back to the Emergency Department if you are not able to see your regular doctor in the amount of time listed above, if you get any new symptoms, or if there is anything that worries you.          Hemoptysis    Hemoptysis is the medical term for coughing up blood. There are many causes for this, including minor illnesses like bronchitis. Hemoptysis can also be an early sign of a more serious illness, like a blood clot in the lung (pulmonary embolism), cancer, tuberculosis, or pneumonia.  Less common causes of hemoptysis can be hard to diagnose in an emergency department or a clinic. More testing will be needed if the symptoms continue.  Home care    Stay away from cigarette smoke. Smoke irritates the bronchial passages.    Unless you are taking daily aspirin to prevent stroke or heart attack, do not take aspirin or products that contain aspirin. Aspirin affects how readily the blood clots. Medicines that prevent clotting may  make hemoptysis worse.    If you have a lung infection, drinking extra fluid will help loosen secretions in the lungs.    Over-the-counter cough medicines that contain dextromethorphan may help reduce coughing. Check with a medical provider before taking dextromethorphan if you have a chronic illness, are pregnant, or take daily medications.    If you were prescribed an antibiotic, take it until it is all gone. Take it even if you are feeling better after only a few days.  Follow-up care  Follow up with your health care provider, or as advised.  When to seek medical advice  Call your healthcare provider right away if any of these occur:    Fever of 100.4 F (38 C) or higher  Call 911, or get immediate medical care  Call emergency services right away if any of these occur:    Coughing up an increased amount of blood    Trouble breathing, wheezing, or pain with breathing    Chest pain or chest pressure    Fainting or losing consciousness    Rapid heartbeat    Weakness or dizziness    4467-9625 [employer]. [address] [place] [address]. All rights reserved. This information is not intended as a substitute for professional medical care. Always follow your healthcare professional's instructions.          24 Hour Appointment Hotline       To make an appointment at any [facility], call [phone] ([phone]). If you don't have a family doctor or clinic, we will help you find one. [facility] clinics are conveniently located to serve the needs of you and your family.             Review of your medicines      START taking        Dose / Directions Last dose taken    albuterol 108 (90 BASE) MCG/ACT Inhaler   Commonly known as:  albuterol   Dose:  2 puff   Quantity:  1 Inhaler        Inhale 2 puffs into the lungs every 4 hours as needed for shortness of breath / dyspnea   Refills:  0          CONTINUE these medicines which may have CHANGED, or have new prescriptions. If we are uncertain of  the size of tablets/capsules you have at home, strength may be listed as something that might have changed.        Dose / Directions Last dose taken    benzonatate 100 MG capsule   Commonly known as:  TESSALON   Dose:  100 mg   What changed:    - medication strength  - how much to take   Quantity:  42 capsule        Take 1 capsule (100 mg) by mouth 3 times daily as needed for cough   Refills:  0                Prescriptions were sent or printed at these locations (2 Prescriptions)                   Other Prescriptions                Printed at Department/Unit printer (2 of 2)         albuterol (ALBUTEROL) 108 (90 BASE) MCG/ACT Inhaler               benzonatate (TESSALON) 100 MG capsule                Procedures and tests performed during your visit     Basic metabolic panel    Beta strep group A culture    CBC with platelets differential    Chest XR,  PA & LAT    Influenza A/B antigen    Rapid strep screen      Orders Needing Specimen Collection     None      Pending Results     Date and Time Order Name Status Description    2/27/2017 0630 Beta strep group A culture Preliminary             Pending Culture Results     Date and Time Order Name Status Description    2/27/2017 0630 Beta strep group A culture Preliminary              Test Results from your hospital stay     2/27/2017  6:53 AM - Interface, Flexilab Results      Component Results     Component    Specimen Description    Throat    Rapid Strep A Screen    NEGATIVE: No Group A streptococcal antigen detected by immunoassay, await   culture report.      Micro Report Status    FINAL 02/27/2017 2/27/2017  7:04 AM - Interface, Flexilab Results      Component Results     Component Value Ref Range & Units Status    Influenza A/B Agn Specimen Nasal  Final    Influenza A Negative NEG Final    Influenza B  NEG Final    Negative   Test results must be correlated with clinical data. If necessary, results   should be confirmed by a molecular assay or viral  culture.           2/27/2017  6:54 AM - Interface, Flexilab Results      Component Results     Component Value Ref Range & Units Status    WBC 18.9 (H) 4.0 - 11.0 10e9/L Final    RBC Count 5.42 4.4 - 5.9 10e12/L Final    Hemoglobin 16.6 13.3 - 17.7 g/dL Final    Hematocrit 48.2 40.0 - 53.0 % Final    MCV 89 78 - 100 fl Final    MCH 30.6 26.5 - 33.0 pg Final    MCHC 34.4 31.5 - 36.5 g/dL Final    RDW 13.1 10.0 - 15.0 % Final    Platelet Count 232 150 - 450 10e9/L Final    Diff Method Automated Method  Final    % Neutrophils 74.7 % Final    % Lymphocytes 12.7 % Final    % Monocytes 11.5 % Final    % Eosinophils 0.2 % Final    % Basophils 0.4 % Final    % Immature Granulocytes 0.5 % Final    Nucleated RBCs 0 0 /100 Final    Absolute Neutrophil 14.1 (H) 1.6 - 8.3 10e9/L Final    Absolute Lymphocytes 2.4 0.8 - 5.3 10e9/L Final    Absolute Monocytes 2.2 (H) 0.0 - 1.3 10e9/L Final    Absolute Eosinophils 0.0 0.0 - 0.7 10e9/L Final    Absolute Basophils 0.1 0.0 - 0.2 10e9/L Final    Abs Immature Granulocytes 0.1 0 - 0.4 10e9/L Final    Absolute Nucleated RBC 0.0  Final         2/27/2017  7:09 AM - Interface, Flexilab Results      Component Results     Component Value Ref Range & Units Status    Sodium 135 133 - 144 mmol/L Final    Potassium 4.0 3.4 - 5.3 mmol/L Final    Chloride 101 94 - 109 mmol/L Final    Carbon Dioxide 23 20 - 32 mmol/L Final    Anion Gap 11 3 - 14 mmol/L Final    Glucose 103 (H) 70 - 99 mg/dL Final    Urea Nitrogen 14 7 - 30 mg/dL Final    Creatinine 0.99 0.66 - 1.25 mg/dL Final    GFR Estimate >90  Non  GFR Calc   >60 mL/min/1.7m2 Final    GFR Estimate If Black >90   GFR Calc   >60 mL/min/1.7m2 Final    Calcium 8.6 8.5 - 10.1 mg/dL Final         2/27/2017  8:38 AM - Interface, Flexilab Results      Component Results     Component    Specimen Description    Throat    Special Requests    Specimen collected in eSwab transport (white cap)    Culture Micro    Pending    Micro  Report Status    Pending         2/27/2017  8:34 AM - Interface, Radiant Ib      Narrative     CHEST TWO VIEWS 2/27/2017 8:26 AM     HISTORY: cough    COMPARISON: 1/5/2017        Impression     IMPRESSION: No significant change. No acute infiltrate or pleural  effusion . Heart and pulmonary vessels within normal limits. Stable  appearance of the mediastinum. Some right paratracheal density is  stable, nonspecific but may be brachiocephalic vessels .    KARSTEN PERALES MD                Clinical Quality Measure: Blood Pressure Screening     Your blood pressure was checked while you were in the emergency department today. The last reading we obtained was  BP: 122/75 (Simultaneous filing. User may not have seen previous data.) . Please read the guidelines below about what these numbers mean and what you should do about them.  If your systolic blood pressure (the top number) is less than 120 and your diastolic blood pressure (the bottom number) is less than 80, then your blood pressure is normal. There is nothing more that you need to do about it.  If your systolic blood pressure (the top number) is 120-139 or your diastolic blood pressure (the bottom number) is 80-89, your blood pressure may be higher than it should be. You should have your blood pressure rechecked within a year by a primary care provider.  If your systolic blood pressure (the top number) is 140 or greater or your diastolic blood pressure (the bottom number) is 90 or greater, you may have high blood pressure. High blood pressure is treatable, but if left untreated over time it can put you at risk for heart attack, stroke, or kidney failure. You should have your blood pressure rechecked by a primary care provider within the next 4 weeks.  If your provider in the emergency department today gave you specific instructions to follow-up with your doctor or provider even sooner than that, you should follow that instruction and not wait for up to 4 weeks for  "your follow-up visit.        Thank you for choosing [facility]       Thank you for choosing [facility] for your care. Our goal is always to provide you with excellent care. Hearing back from our patients is one way we can continue to improve our services. Please take a few minutes to complete the written survey that you may receive in the mail after you visit with us. Thank you!        [employer] Information     [employer] lets you send messages to your doctor, view your test results, renew your prescriptions, schedule appointments and more. To sign up, go to [email or web address] . Click on \"Log in\" on the left side of the screen, which will take you to the Welcome page. Then click on \"Sign up Now\" on the right side of the page.     You will be asked to enter the access code listed below, as well as some personal information. Please follow the directions to create your username and password.     Your access code is: [email or web address]  Expires: 2017  4:46 PM     Your access code will  in 90 days. If you need help or a new code, please call your [facility] clinic or [phone].        Care EveryWhere ID     This is your Care EveryWhere ID. This could be used by other organizations to access your [facility] medical records  [id number]        After Visit Summary       This is your record. Keep this with you and show to your community pharmacist(s) and doctor(s) at your next visit.                  "

## 2017-02-27 NOTE — LETTER
[facility] EMERGENCY DEPARTMENT  [address]  [facility] [address]  [phone]    Luan Haynes  [address]  [facility] [address]  [phone] (home) none (work)    : 1990      To Whom it may concern:    Luan Haynes was seen in our Emergency Department today, 2017.  I expect his condition to improve over the next two days.  He may return to work/school when improved.    Sincerely,        Gely Lawrence

## 2017-03-01 LAB
BACTERIA SPEC CULT: NORMAL
Lab: NORMAL
MICRO REPORT STATUS: NORMAL
SPECIMEN SOURCE: NORMAL

## 2018-12-05 ENCOUNTER — HOSPITAL ENCOUNTER (EMERGENCY)
Facility: CLINIC | Age: 28
Discharge: HOME | End: 2018-12-05
Attending: EMERGENCY MEDICINE | Admitting: EMERGENCY MEDICINE

## 2018-12-05 ENCOUNTER — APPOINTMENT (OUTPATIENT)
Dept: GENERAL RADIOLOGY | Facility: CLINIC | Age: 28
End: 2018-12-05
Attending: EMERGENCY MEDICINE

## 2018-12-05 VITALS
BODY MASS INDEX: 48.61 KG/M2 | RESPIRATION RATE: 11 BRPM | OXYGEN SATURATION: 96 % | TEMPERATURE: 98.6 F | HEART RATE: 98 BPM | DIASTOLIC BLOOD PRESSURE: 57 MMHG | SYSTOLIC BLOOD PRESSURE: 113 MMHG | WEIGHT: 315 LBS

## 2018-12-05 DIAGNOSIS — R07.9 CHEST PAIN, UNSPECIFIED TYPE: ICD-10-CM

## 2018-12-05 LAB
ANION GAP SERPL CALCULATED.3IONS-SCNC: 6 MMOL/L (ref 3–14)
BUN SERPL-MCNC: 10 MG/DL (ref 7–30)
CALCIUM SERPL-MCNC: 8.3 MG/DL (ref 8.5–10.1)
CHLORIDE SERPL-SCNC: 103 MMOL/L (ref 94–109)
CO2 SERPL-SCNC: 27 MMOL/L (ref 20–32)
CREAT SERPL-MCNC: 1.02 MG/DL (ref 0.66–1.25)
D DIMER PPP FEU-MCNC: <0.3 UG/ML FEU (ref 0–0.5)
ERYTHROCYTE [DISTWIDTH] IN BLOOD BY AUTOMATED COUNT: 12.6 % (ref 10–15)
GFR SERPL CREATININE-BSD FRML MDRD: 87 ML/MIN/1.7M2
GLUCOSE SERPL-MCNC: 82 MG/DL (ref 70–99)
HCT VFR BLD AUTO: 46.8 % (ref 40–53)
HGB BLD-MCNC: 15.8 G/DL (ref 13.3–17.7)
MCH RBC QN AUTO: 29.7 PG (ref 26.5–33)
MCHC RBC AUTO-ENTMCNC: 33.8 G/DL (ref 31.5–36.5)
MCV RBC AUTO: 88 FL (ref 78–100)
PLATELET # BLD AUTO: 281 10E9/L (ref 150–450)
POTASSIUM SERPL-SCNC: 4 MMOL/L (ref 3.4–5.3)
RBC # BLD AUTO: 5.32 10E12/L (ref 4.4–5.9)
SODIUM SERPL-SCNC: 136 MMOL/L (ref 133–144)
TROPONIN I SERPL-MCNC: <0.015 UG/L (ref 0–0.04)
WBC # BLD AUTO: 11.5 10E9/L (ref 4–11)

## 2018-12-05 PROCEDURE — 80048 BASIC METABOLIC PNL TOTAL CA: CPT | Performed by: EMERGENCY MEDICINE

## 2018-12-05 PROCEDURE — 99284 EMERGENCY DEPT VISIT MOD MDM: CPT | Mod: 25

## 2018-12-05 PROCEDURE — 25000128 H RX IP 250 OP 636: Performed by: EMERGENCY MEDICINE

## 2018-12-05 PROCEDURE — 71046 X-RAY EXAM CHEST 2 VIEWS: CPT

## 2018-12-05 PROCEDURE — 85027 COMPLETE CBC AUTOMATED: CPT | Performed by: EMERGENCY MEDICINE

## 2018-12-05 PROCEDURE — 84484 ASSAY OF TROPONIN QUANT: CPT | Performed by: EMERGENCY MEDICINE

## 2018-12-05 PROCEDURE — 85379 FIBRIN DEGRADATION QUANT: CPT | Performed by: EMERGENCY MEDICINE

## 2018-12-05 PROCEDURE — 96374 THER/PROPH/DIAG INJ IV PUSH: CPT

## 2018-12-05 PROCEDURE — 93005 ELECTROCARDIOGRAM TRACING: CPT

## 2018-12-05 RX ORDER — NAPROXEN 500 MG/1
500 TABLET ORAL 2 TIMES DAILY WITH MEALS
Qty: 20 TABLET | Refills: 0 | Status: SHIPPED | OUTPATIENT
Start: 2018-12-05 | End: 2019-02-25

## 2018-12-05 RX ORDER — KETOROLAC TROMETHAMINE 15 MG/ML
15 INJECTION, SOLUTION INTRAMUSCULAR; INTRAVENOUS ONCE
Status: COMPLETED | OUTPATIENT
Start: 2018-12-05 | End: 2018-12-05

## 2018-12-05 RX ADMIN — KETOROLAC TROMETHAMINE 15 MG: 15 INJECTION, SOLUTION INTRAMUSCULAR; INTRAVENOUS at 17:19

## 2018-12-05 ASSESSMENT — ENCOUNTER SYMPTOMS
ABDOMINAL PAIN: 0
VOMITING: 0
COUGH: 1
NAUSEA: 0

## 2018-12-05 NOTE — ED AVS SNAPSHOT
[facility] Emergency Department    [address]    [facility] [address]    Phone:  [phone]    Fax:  [phone]                                       Luan Haynes   MRN: [id number]    Department:  [facility] Emergency Department   Date of Visit:  12/5/2018           After Visit Summary Signature Page     I have received my discharge instructions, and my questions have been answered. I have discussed any challenges I see with this plan with the nurse or doctor.    ..........................................................................................................................................  Patient/Patient Representative Signature      ..........................................................................................................................................  Patient Representative Print Name and Relationship to Patient    ..................................................               ................................................  Date                                   Time    ..........................................................................................................................................  Reviewed by Signature/Title    ...................................................              ..............................................  Date                                               Time          22EPIC Rev 08/18

## 2018-12-05 NOTE — ED AVS SNAPSHOT
[facility] Emergency Department    [address] [place] [address]    Phone:  [phone]    Fax:  [phone]                                       Luan Haynes   MRN: [id number]    Department:  [facility] Emergency Department   Date of Visit:  12/5/2018           Patient Information     Date Of Birth          1990        Your diagnoses for this visit were:     Chest pain, unspecified type        You were seen by Manoj Mora MD.        Discharge Instructions       Please make an appointment to follow up with [facility] [phone] in 2-3 days even if entirely better.      Discharge Instructions  Chest Pain    You have been seen today for chest pain or discomfort.  At this time, your provider has found no signs that your chest pain is due to a serious or life-threatening condition, (or you have declined more testing and/or admission to the hospital). However, sometimes there is a serious problem that does not show up right away. Your evaluation today may not be complete and you may need further testing and evaluation.     Generally, every Emergency Department visit should have a follow-up clinic visit with either a primary or a specialty clinic/provider. Please follow-up as instructed by your emergency provider today.  Return to the Emergency Department if:    Your chest pain changes, gets worse, starts to happen more often, or comes with less activity.    You are newly short of breath.    You get very weak or tired.    You pass out or faint.    You have any new symptoms, like fever, cough, numb legs, or you cough up blood.    You have anything else that worries you.    Until you follow-up with your regular provider, please do the following:    Take one aspirin daily unless you have an allergy or are told not to by your provider.    If a stress test appointment has been made, go to the appointment.    If you have questions, contact your  regular provider.    Follow-up with your regular provider/clinic as directed; this is very important.    If you were given a prescription for medicine here today, be sure to read all of the information (including the package insert) that comes with your prescription.  This will include important information about the medicine, its side effects, and any warnings that you need to know about.  The pharmacist who fills the prescription can provide more information and answer questions you may have about the medicine.  If you have questions or concerns that the pharmacist cannot address, please call or return to the Emergency Department.       Remember that you can always come back to the Emergency Department if you are not able to see your regular provider in the amount of time listed above, if you get any new symptoms, or if there is anything that worries you.      24 Hour Appointment Hotline       To make an appointment at any [facility], call [phone] ([phone]). If you don't have a family doctor or clinic, we will help you find one. [place] clinics are conveniently located to serve the needs of you and your family.             Review of your medicines      START taking        Dose / Directions Last dose taken    naproxen 500 MG tablet   Commonly known as:  NAPROSYN   Dose:  500 mg   Quantity:  20 tablet        Take 1 tablet (500 mg) by mouth 2 times daily (with meals) for 10 days   Refills:  0                Prescriptions were sent or printed at these locations (1 Prescription)                   Other Prescriptions                Printed at Department/Unit printer (1 of 1)         naproxen (NAPROSYN) 500 MG tablet                Procedures and tests performed during your visit     Basic metabolic panel (BMP)    CBC (platelets, no diff)    Chest XR,  PA & LAT    D dimer quantitative    EKG 12 lead    Peripheral IV catheter    Troponin I      Orders Needing Specimen Collection     None      Pending  Results     Date and Time Order Name Status Description    12/5/2018 1740 Chest XR,  PA & LAT Preliminary             Pending Culture Results     No orders found from 12/3/2018 to 12/6/2018.            Pending Results Instructions     If you had any lab results that were not finalized at the time of your Discharge, you can call the ED Lab Result RN at [phone]. You will be contacted by this team for any positive Lab results or changes in treatment. The nurses are available 7 days a week from 10A to 6:30P.  You can leave a message 24 hours per day and they will return your call.        Test Results From Your Hospital Stay        12/5/2018  5:23 PM      Component Results     Component Value Ref Range & Units Status    WBC 11.5 (H) 4.0 - 11.0 10e9/L Final    RBC Count 5.32 4.4 - 5.9 10e12/L Final    Hemoglobin 15.8 13.3 - 17.7 g/dL Final    Hematocrit 46.8 40.0 - 53.0 % Final    MCV 88 78 - 100 fl Final    MCH 29.7 26.5 - 33.0 pg Final    MCHC 33.8 31.5 - 36.5 g/dL Final    RDW 12.6 10.0 - 15.0 % Final    Platelet Count 281 150 - 450 10e9/L Final         12/5/2018  5:41 PM      Component Results     Component Value Ref Range & Units Status    Sodium 136 133 - 144 mmol/L Final    Potassium 4.0 3.4 - 5.3 mmol/L Final    Chloride 103 94 - 109 mmol/L Final    Carbon Dioxide 27 20 - 32 mmol/L Final    Anion Gap 6 3 - 14 mmol/L Final    Glucose 82 70 - 99 mg/dL Final    Urea Nitrogen 10 7 - 30 mg/dL Final    Creatinine 1.02 0.66 - 1.25 mg/dL Final    GFR Estimate 87 >60 mL/min/1.7m2 Final    Non  GFR Calc    GFR Estimate If Black >90 >60 mL/min/1.7m2 Final    African American GFR Calc    Calcium 8.3 (L) 8.5 - 10.1 mg/dL Final         12/5/2018  5:41 PM      Component Results     Component Value Ref Range & Units Status    Troponin I ES <0.015 0.000 - 0.045 ug/L Final    The 99th percentile for upper reference range is 0.045 ug/L.  Troponin values   in the range of 0.045 - 0.120 ug/L may be associated with  risks of adverse   clinical events.           12/5/2018  5:32 PM      Component Results     Component Value Ref Range & Units Status    D Dimer <0.3 0.0 - 0.50 ug/ml FEU Final    This D-dimer assay is intended for use in conjunction with a clinical pretest   probability assessment model to exclude pulmonary embolism (PE) and deep   venous thrombosis (DVT) in outpatients suspected of PE or DVT. The cut-off   value is 0.5 ug/mL FEU.           12/5/2018  6:40 PM      Narrative     CHEST TWO VIEWS  12/5/2018 6:34 PM     HISTORY: Chest pain.    COMPARISON: 2/27/2017        Impression     IMPRESSION: Normal. No change.                Clinical Quality Measure: Blood Pressure Screening     Your blood pressure was checked while you were in the emergency department today. The last reading we obtained was  BP: (!) 112/93 . Please read the guidelines below about what these numbers mean and what you should do about them.  If your systolic blood pressure (the top number) is less than 120 and your diastolic blood pressure (the bottom number) is less than 80, then your blood pressure is normal. There is nothing more that you need to do about it.  If your systolic blood pressure (the top number) is 120-139 or your diastolic blood pressure (the bottom number) is 80-89, your blood pressure may be higher than it should be. You should have your blood pressure rechecked within a year by a primary care provider.  If your systolic blood pressure (the top number) is 140 or greater or your diastolic blood pressure (the bottom number) is 90 or greater, you may have high blood pressure. High blood pressure is treatable, but if left untreated over time it can put you at risk for heart attack, stroke, or kidney failure. You should have your blood pressure rechecked by a primary care provider within the next 4 weeks.  If your provider in the emergency department today gave you specific instructions to follow-up with your doctor or provider even  "sooner than that, you should follow that instruction and not wait for up to 4 weeks for your follow-up visit.        Thank you for choosing [facility]       Thank you for choosing [facility] for your care. Our goal is always to provide you with excellent care. Hearing back from our patients is one way we can continue to improve our services. Please take a few minutes to complete the written survey that you may receive in the mail after you visit with us. Thank you!        [employer] Information     [employer] lets you send messages to your doctor, view your test results, renew your prescriptions, schedule appointments and more. To sign up, go to [email or web address] . Click on \"Log in\" on the left side of the screen, which will take you to the Welcome page. Then click on \"Sign up Now\" on the right side of the page.     You will be asked to enter the access code listed below, as well as some personal information. Please follow the directions to create your username and password.     Your access code is: [email or web address]  Expires: 3/5/2019  7:04 PM     Your access code will  in 90 days. If you need help or a new code, please call your [facility] clinic or [phone].        Care EveryWhere ID     This is your Care EveryWhere ID. This could be used by other organizations to access your [facility] medical records  [id number]        Equal Access to Services     HAKEEM BETHEA : Hadviktor Beth, waaxda sam, qaybta kaalwaleska xiong, candelaria thacker. [facility] [phone].    ATENCIÓN: Si habla español, tiene a hollingsworth disposición servicios gratuitos de asistencia lingüística. Joseph al [phone].    We comply with applicable federal civil rights laws and [place] laws. We do not discriminate on the basis of race, color, national origin, age, disability, sex, sexual orientation, or gender identity.            After Visit Summary       This is your record. Keep this with you and show to " your community pharmacist(s) and doctor(s) at your next visit.

## 2018-12-05 NOTE — ED PROVIDER NOTES
History     Chief Complaint:  Chest pain     HPI   Luan Haynes is a 28 year old male smoker who presents with chest pain. The patient states that he had developed chest pain yesterday at 1000 while at work and had gradually worsened in severity throughout the day and today, prompting his ED visit. Here, the patient states that his pain worsens with deep breaths and locates that pain to the right flank area, rib cage area, as well as across the chest. The pain is constant without any pain free episodes since onset. The patient endorses cough with a twinge of blood, but notes that it is not uncommon. He denies any associated symptom including abdominal pain, nausea, vomiting, or previous presentation of his symptoms. The patient     Cardiac/PE/DVT Risk Factors:  The patient has no history of hypertension, hyperlipidemia, diabetes, but is a smoker. The patient denies any personal or familial history of PE, DVT, or clotting disorder. The patient reports no recent travel, surgery, or other immobilizations.      Allergies:  NKDA     Medications:    The patient is currently on no regular medications.     Past Medical History:    Foot injury  Neuropathic pain    Past Surgical History:    Appendectomy  Hand surgery    Family History:    No past pertinent family history.     Social History:  Current Every Day Smoker - 0.5 ppd  Negative for alcohol use.  Marital Status:  Single [1]     Review of Systems   Respiratory: Positive for cough.    Cardiovascular: Positive for chest pain.   Gastrointestinal: Negative for abdominal pain, nausea and vomiting.   All other systems reviewed and are negative.      Physical Exam   First Vitals:  BP: 127/85  Pulse: 98  Temp: 98.6  F (37  C)  Resp: 16  Weight: 145 kg (319 lb 10.7 oz)  SpO2: 99 %    Physical Exam    General:   Pleasant, age appropriate.  HEENT:    Oropharynx is moist, without lesions or trismus.  Eyes:    Conjunctiva normal  Neck:    Supple, no meningismus.     CV:      Regular rate and rhythm.      No murmurs, rubs or gallops.       No JVD or unilateral leg swelling.       2+ radial pulses bilateral.       No lower extremity edema.  PULM:    Clear to auscultation bilateral.       No respiratory distress.      Good air exchange.     No rales or wheezing.     No stridor.  ABD:    Soft, non-tender, non-distended.       No pulsatile masses.       No rebound, guarding or rigidity.  MSK:     No gross deformity to all four extremities.   LYMPH:   No cervical lymphadenopathy.  NEURO:   Alert. Good muscle tone, no atrophy.  Skin:    Warm, dry and intact.    Psych:    Mood is good and affect is appropriate.        Emergency Department Course   ECG:  Indication: chest pain   Time: 1618  Vent. Rate 90 bpm. TN interval 168. QRS duration 102. QT/QTc 348/425. P-R-T axis 60 8 26. Normal sinus rhythm. Normal ECG. Read time: 1623. No significant changes to EKG dated 1/5/17.      Imaging:  Radiographic findings were communicated with the patient who voiced understanding of the findings.  XR Chest 2 views:   Normal. No change. As per radiology.     Laboratory:  CBC: WBC: 11.5 (H), HGB: 15.8, PLT: 281  BMP: Calcium: 8.3 (L), o/w WNL (Creatinine: 1.02)     Troponin: <0.015  D Dimer: <0.3    Interventions:   1719 Toradol, 15 mg, IV    Emergency Department Course:  Nursing notes and vitals reviewed.     1628  I performed an exam of the patient as documented above.     IV inserted. Medicine administered as documented above. Blood drawn. This was sent to the lab for further testing, results above.    The patient was sent for a chest XR while in the emergency department, findings above.     1900 I rechecked the patient and discussed the results of his workup thus far.     Findings and plan explained to the Patient. Patient discharged home with instructions regarding supportive care, medications, and reasons to return. The importance of close follow-up was reviewed. The patient was prescribed Naproxen.      I personally reviewed the laboratory results with the Patient and answered all related questions prior to discharge.       Impression & Plan      Medical Decision Makin-year-old male presented to the ED with a 1 day history of chest pain.  EKG shows no ischemic changes.  His troponin is within normal limits despite greater than 24 hours of constant pain thus ruling out MI.  Low suspicion for pulmonary embolus.  D-dimer sent and is negative thus no indication for CT scan of the chest.  He has no features concerning for pericarditis, myocarditis, aortic aneurysm or aortic dissection.  Differential diagnosis would primarily include costochondritis, muscle strain, pleurisy, atypical esophageal spasm or reflux.  Patient will be discharged home with NSAIDs, close follow-up with primary care physician and return to ED for any worsening symptoms.    Diagnosis:    ICD-10-CM   1. Chest pain, unspecified type R07.9       Disposition:  discharged to home    Discharge Medications:  New Prescriptions    NAPROXEN (NAPROSYN) 500 MG TABLET    Take 1 tablet (500 mg) by mouth 2 times daily (with meals) for 10 days     IGely, am serving as a scribe on 2018 at 4:28 PM to personally document services performed by Manoj Mora MD based on my observations and the provider's statements to me.      Gely Santiago  2018   [facility] EMERGENCY DEPARTMENT       Manoj Mora MD  182

## 2018-12-05 NOTE — LETTER
December 5, 2018      To Whom It May Concern:      Luan Haynes was seen in our Emergency Department today, 12/05/18.  I expect his condition to improve over the next 1-2 days.  He may return to work/school when improved.    Sincerely,        Hallie Wong RN

## 2018-12-06 ENCOUNTER — NURSE TRIAGE (OUTPATIENT)
Dept: NURSING | Facility: CLINIC | Age: 28
End: 2018-12-06

## 2018-12-06 LAB — INTERPRETATION ECG - MUSE: NORMAL

## 2018-12-06 NOTE — DISCHARGE INSTRUCTIONS
Please make an appointment to follow up with [facility] [phone] in 2-3 days even if entirely better.      Discharge Instructions  Chest Pain    You have been seen today for chest pain or discomfort.  At this time, your provider has found no signs that your chest pain is due to a serious or life-threatening condition, (or you have declined more testing and/or admission to the hospital). However, sometimes there is a serious problem that does not show up right away. Your evaluation today may not be complete and you may need further testing and evaluation.     Generally, every Emergency Department visit should have a follow-up clinic visit with either a primary or a specialty clinic/provider. Please follow-up as instructed by your emergency provider today.  Return to the Emergency Department if:    Your chest pain changes, gets worse, starts to happen more often, or comes with less activity.    You are newly short of breath.    You get very weak or tired.    You pass out or faint.    You have any new symptoms, like fever, cough, numb legs, or you cough up blood.    You have anything else that worries you.    Until you follow-up with your regular provider, please do the following:    Take one aspirin daily unless you have an allergy or are told not to by your provider.    If a stress test appointment has been made, go to the appointment.    If you have questions, contact your regular provider.    Follow-up with your regular provider/clinic as directed; this is very important.    If you were given a prescription for medicine here today, be sure to read all of the information (including the package insert) that comes with your prescription.  This will include important information about the medicine, its side effects, and any warnings that you need to know about.  The pharmacist who fills the prescription can provide more information and answer questions you may have about the medicine.  If you have  questions or concerns that the pharmacist cannot address, please call or return to the Emergency Department.       Remember that you can always come back to the Emergency Department if you are not able to see your regular provider in the amount of time listed above, if you get any new symptoms, or if there is anything that worries you.

## 2018-12-06 NOTE — TELEPHONE ENCOUNTER
"  Reason for Disposition    Difficulty breathing    Additional Information    Negative: Severe difficulty breathing (e.g., struggling for each breath, speaks in single words)    Negative: Difficult to awaken or acting confused (e.g., disoriented, slurred speech)    Negative: Shock suspected (e.g., cold/pale/clammy skin, too weak to stand, low BP, rapid pulse)    Negative: [1] Chest pain lasts > 5 minutes AND [2] history of heart disease  (i.e., heart attack, bypass surgery, angina, angioplasty, CHF; not just a heart murmur)    Negative: [1] Chest pain lasts > 5 minutes AND [2] described as crushing, pressure-like, or heavy    Negative: [1] Chest pain lasts > 5 minutes AND [2] age > 50    Negative: [1] Chest pain lasts > 5 minutes AND [2] age > 30 AND [3] at least one cardiac risk factor (i.e., hypertension, diabetes, obesity, smoker or strong family history of heart disease)    Negative: [1] Chest pain lasts > 5 minutes AND [2] not relieved with nitroglycerin    Negative: Passed out (i.e., lost consciousness, collapsed and was not responding)    Negative: Heart beating < 50 beats per minute OR > 140 beats per minute    Negative: Visible sweat on face or sweat dripping down face    Negative: Sounds like a life-threatening emergency to the triager    Negative: Followed a chest injury    Negative: SEVERE chest pain    Negative: [1] Intermittent  chest pain or \"angina\" AND [2] increasing in severity or frequency  (Exception: pains lasting a few seconds)    Negative: Pain also present in shoulder(s) or arm(s) or jaw  (Exception: pain is clearly made worse by movement)    Protocols used: CHEST PAIN-ADULT-AH  Patient was seen in the emergency room yesterday for chest pain.  He was worked up and found to not have any cardiac issues.  Today at noon he developed chest pain again that is also radiating into his right arm.  He also feels a little short of breath.  He denies any injury or illness.  He will go back to the " emergency room for his symptoms.

## 2019-02-25 ENCOUNTER — APPOINTMENT (OUTPATIENT)
Dept: GENERAL RADIOLOGY | Facility: CLINIC | Age: 29
End: 2019-02-25
Attending: EMERGENCY MEDICINE

## 2019-02-25 ENCOUNTER — HOSPITAL ENCOUNTER (EMERGENCY)
Facility: CLINIC | Age: 29
Discharge: HOME | End: 2019-02-25
Attending: EMERGENCY MEDICINE | Admitting: EMERGENCY MEDICINE

## 2019-02-25 VITALS
RESPIRATION RATE: 17 BRPM | SYSTOLIC BLOOD PRESSURE: 117 MMHG | HEART RATE: 96 BPM | OXYGEN SATURATION: 92 % | BODY MASS INDEX: 48.66 KG/M2 | DIASTOLIC BLOOD PRESSURE: 61 MMHG | TEMPERATURE: 97.5 F | WEIGHT: 315 LBS

## 2019-02-25 DIAGNOSIS — Z72.0 TOBACCO ABUSE: ICD-10-CM

## 2019-02-25 DIAGNOSIS — J40 BRONCHITIS: ICD-10-CM

## 2019-02-25 DIAGNOSIS — R07.81 PLEURITIC CHEST PAIN: ICD-10-CM

## 2019-02-25 LAB
ANION GAP SERPL CALCULATED.3IONS-SCNC: 8 MMOL/L (ref 3–14)
BASOPHILS # BLD AUTO: 0.1 10E9/L (ref 0–0.2)
BASOPHILS NFR BLD AUTO: 1.1 %
BUN SERPL-MCNC: 12 MG/DL (ref 7–30)
CALCIUM SERPL-MCNC: 8.6 MG/DL (ref 8.5–10.1)
CHLORIDE SERPL-SCNC: 105 MMOL/L (ref 94–109)
CO2 SERPL-SCNC: 22 MMOL/L (ref 20–32)
CREAT SERPL-MCNC: 0.92 MG/DL (ref 0.66–1.25)
D DIMER PPP FEU-MCNC: 0.4 UG/ML FEU (ref 0–0.5)
DIFFERENTIAL METHOD BLD: NORMAL
EOSINOPHIL # BLD AUTO: 0.2 10E9/L (ref 0–0.7)
EOSINOPHIL NFR BLD AUTO: 2 %
ERYTHROCYTE [DISTWIDTH] IN BLOOD BY AUTOMATED COUNT: 13 % (ref 10–15)
FLUAV+FLUBV AG SPEC QL: NEGATIVE
FLUAV+FLUBV AG SPEC QL: NEGATIVE
GFR SERPL CREATININE-BSD FRML MDRD: >90 ML/MIN/{1.73_M2}
GLUCOSE SERPL-MCNC: 104 MG/DL (ref 70–99)
HCT VFR BLD AUTO: 46.8 % (ref 40–53)
HGB BLD-MCNC: 16.2 G/DL (ref 13.3–17.7)
IMM GRANULOCYTES # BLD: 0 10E9/L (ref 0–0.4)
IMM GRANULOCYTES NFR BLD: 0.5 %
INTERPRETATION ECG - MUSE: NORMAL
LYMPHOCYTES # BLD AUTO: 1.9 10E9/L (ref 0.8–5.3)
LYMPHOCYTES NFR BLD AUTO: 23.2 %
MCH RBC QN AUTO: 30.1 PG (ref 26.5–33)
MCHC RBC AUTO-ENTMCNC: 34.6 G/DL (ref 31.5–36.5)
MCV RBC AUTO: 87 FL (ref 78–100)
MONOCYTES # BLD AUTO: 1.1 10E9/L (ref 0–1.3)
MONOCYTES NFR BLD AUTO: 14.1 %
NEUTROPHILS # BLD AUTO: 4.7 10E9/L (ref 1.6–8.3)
NEUTROPHILS NFR BLD AUTO: 59.1 %
NRBC # BLD AUTO: 0 10*3/UL
NRBC BLD AUTO-RTO: 0 /100
PLATELET # BLD AUTO: 247 10E9/L (ref 150–450)
POTASSIUM SERPL-SCNC: 3.8 MMOL/L (ref 3.4–5.3)
RBC # BLD AUTO: 5.39 10E12/L (ref 4.4–5.9)
SODIUM SERPL-SCNC: 135 MMOL/L (ref 133–144)
SPECIMEN SOURCE: NORMAL
WBC # BLD AUTO: 8 10E9/L (ref 4–11)

## 2019-02-25 PROCEDURE — 93005 ELECTROCARDIOGRAM TRACING: CPT

## 2019-02-25 PROCEDURE — 85379 FIBRIN DEGRADATION QUANT: CPT | Performed by: EMERGENCY MEDICINE

## 2019-02-25 PROCEDURE — 25000125 ZZHC RX 250: Performed by: EMERGENCY MEDICINE

## 2019-02-25 PROCEDURE — 71046 X-RAY EXAM CHEST 2 VIEWS: CPT

## 2019-02-25 PROCEDURE — 25000128 H RX IP 250 OP 636: Performed by: EMERGENCY MEDICINE

## 2019-02-25 PROCEDURE — 96360 HYDRATION IV INFUSION INIT: CPT

## 2019-02-25 PROCEDURE — 80048 BASIC METABOLIC PNL TOTAL CA: CPT | Performed by: EMERGENCY MEDICINE

## 2019-02-25 PROCEDURE — 96361 HYDRATE IV INFUSION ADD-ON: CPT

## 2019-02-25 PROCEDURE — 99285 EMERGENCY DEPT VISIT HI MDM: CPT | Mod: 25

## 2019-02-25 PROCEDURE — 25000132 ZZH RX MED GY IP 250 OP 250 PS 637: Performed by: EMERGENCY MEDICINE

## 2019-02-25 PROCEDURE — 87804 INFLUENZA ASSAY W/OPTIC: CPT | Performed by: EMERGENCY MEDICINE

## 2019-02-25 PROCEDURE — 94640 AIRWAY INHALATION TREATMENT: CPT

## 2019-02-25 PROCEDURE — 85025 COMPLETE CBC W/AUTO DIFF WBC: CPT | Performed by: EMERGENCY MEDICINE

## 2019-02-25 RX ORDER — LIDOCAINE 4 G/G
1 PATCH TOPICAL ONCE
Status: DISCONTINUED | OUTPATIENT
Start: 2019-02-25 | End: 2019-02-25 | Stop reason: HOSPADM

## 2019-02-25 RX ORDER — ACETAMINOPHEN 325 MG/1
650 TABLET ORAL ONCE
Status: DISCONTINUED | OUTPATIENT
Start: 2019-02-25 | End: 2019-02-25 | Stop reason: HOSPADM

## 2019-02-25 RX ORDER — IPRATROPIUM BROMIDE AND ALBUTEROL SULFATE 2.5; .5 MG/3ML; MG/3ML
3 SOLUTION RESPIRATORY (INHALATION) ONCE
Status: COMPLETED | OUTPATIENT
Start: 2019-02-25 | End: 2019-02-25

## 2019-02-25 RX ADMIN — SODIUM CHLORIDE 1000 ML: 9 INJECTION, SOLUTION INTRAVENOUS at 08:32

## 2019-02-25 RX ADMIN — LIDOCAINE 1 PATCH: 560 PATCH PERCUTANEOUS; TOPICAL; TRANSDERMAL at 08:32

## 2019-02-25 RX ADMIN — IPRATROPIUM BROMIDE AND ALBUTEROL SULFATE 3 ML: .5; 3 SOLUTION RESPIRATORY (INHALATION) at 08:35

## 2019-02-25 ASSESSMENT — ENCOUNTER SYMPTOMS
FEVER: 0
NAUSEA: 0
DIARRHEA: 0
SORE THROAT: 1
COUGH: 1
ABDOMINAL PAIN: 0
VOMITING: 0
RHINORRHEA: 1

## 2019-02-25 NOTE — DISCHARGE INSTRUCTIONS
Lots of fluids and rest.  Consider over the counter treatments for cough and congestion.  Follow up with primary care later this week (clinic provided) to ensure you are improving as expected.  Tylenol or Motrin as needed for pain and consider warm pack.  Return with severe pain, fever >101F, copious blood with cough, numbness that does not improve, weakness, or ANY OTHER CONCERNS!!!

## 2019-02-25 NOTE — ED AVS SNAPSHOT
[facility] Emergency Department  [address]  [facility] [address]  Phone:  [phone]  Fax:  [phone]                                    Luan Haynes   MRN: [id number]    Department:  [facility] Emergency Department   Date of Visit:  2/25/2019           After Visit Summary Signature Page    I have received my discharge instructions, and my questions have been answered. I have discussed any challenges I see with this plan with the nurse or doctor.    ..........................................................................................................................................  Patient/Patient Representative Signature      ..........................................................................................................................................  Patient Representative Print Name and Relationship to Patient    ..................................................               ................................................  Date                                   Time    ..........................................................................................................................................  Reviewed by Signature/Title    ...................................................              ..............................................  Date                                               Time          22EPIC Rev 08/18

## 2019-02-25 NOTE — ED PROVIDER NOTES
History     Chief Complaint:  Cough     The history is provided by the patient.      Luan Haynes is an otherwise healthy 28 year old male smoker who presents for evaluation of a cough. Approximately three days ago, he developed a cough with rhinorrhea and a sore throat. He had a small amount of hemoptysis yesterday and this morning. Yesterday he also developed pain in the left mid back that is exacerbated by deep inspiration and coughing. He has had no measured fever, abdominal pain, nausea, vomiting, or diarrhea. He denies recent travel or surgery. He has had several coworkers with illnesses and cough.     Of note, patient remarks he has had right upper extremity intermittent numbness for a long time which he attributes to rotator cuff injury. In the last few days he has also developed intermittent left upper extremity numbness. This is not currently present. He has had no recent arm, neck, or back injuries.    PE/DVT Risk Factors:   Hx of PE/DVT:   Negative   Hx of clotting disorder:  Negative   Hx of cancer:    Negative   Tobacco use:    Positive   Hormone therapy:   Negative   Prolonged immobilization:  Negative   Recent surgery:   Negative   Recent travel:    Negative   Familial Hx of PE/DVT:  Negative      Allergies:  NKDA      Medications:    The patient is not currently taking any prescribed medications.     Past Medical History:    Crush injury, ankle   Neuropathic pain     Past Surgical History:    Appendectomy   Hand surgery, left     Family History:    History reviewed. No pertinent family history.     Social History:  Tobacco use:    Current some day smoker   Alcohol use:    Negative   Marital status:    Single   Accompanied to ED by:  Alone      Review of Systems   Constitutional: Negative for fever.   HENT: Positive for rhinorrhea and sore throat.    Respiratory: Positive for cough.    Cardiovascular: Negative for chest pain.   Gastrointestinal: Negative for abdominal pain, diarrhea, nausea and  vomiting.   Musculoskeletal: Positive for back pain (left thoracic).   Neurological: Positive for numbness (intermittent bilateral upper extremities, not currently present).   All other systems reviewed and are negative.    Physical Exam     Patient Vitals for the past 24 hrs:   BP Temp Temp src Pulse Heart Rate Resp SpO2 Weight   02/25/19 0930 117/61 -- -- 96 102 17 92 % --   02/25/19 0915 107/62 -- -- 98 109 17 92 % --   02/25/19 0900 117/73 -- -- 107 109 14 93 % --   02/25/19 0845 111/57 -- -- 107 110 14 93 % --   02/25/19 0830 121/81 -- -- 109 111 17 94 % --   02/25/19 0822 -- 97.5  F (36.4  C) Oral -- -- -- -- --   02/25/19 0815 124/62 -- -- 107 111 22 95 % --   02/25/19 0800 140/75 -- -- 120 -- -- -- --   02/25/19 0748 (!) 138/108 98.1  F (36.7  C) Temporal 120 -- 20 95 % 145.2 kg (320 lb)        Physical Exam  General: Well-developed. Obese. Well appearing young  man. Cooperative.  Head:  Atraumatic.  Eyes:  Conjunctivae, lids, and sclerae are normal.  ENT:    Normal nose. Moist mucous membranes. Normal posterior oropharynx.  Neck:  Supple. Normal range of motion.  CV:  Tachycardic rate and regular rhythm. Normal heart sounds with no murmurs, rubs, or gallops detected.  Resp:  No respiratory distress. Clear to auscultation bilaterally without decreased breath sounds, wheezing, rales, or rhonchi.  GI:  Soft. Non-distended. Non-tender.    MS:  Normal ROM. No bilateral lower extremity edema or calf tenderness. Tenderness to palpation over the left inferior ribs posteriorly without step-offs or deformities.  Skin:  Warm. Non-diaphoretic. No pallor.  Neuro: Awake. A&Ox3. Normal strength including 5/5 strength with hand grasp. Sensation intact to light touch throughout.  Psych:  Normal mood and affect. Normal speech.  Vitals reviewed.      Emergency Department Course   EKG  Indication: Cough and Back Pain   Time: 8:21:57  Rate 110 bpm. PA interval 162 ms. QRS duration 94 ms. QT/QTc 322/435 ms.   Sinus  tachycardia, otherwise normal ECG    No acute ST changes.  No significant changes as compared to prior, dated 12/5/2018.    Imaging:  Radiographic findings were communicated with the patient who voiced understanding of the findings.    XR Chest:  IMPRESSION: Heart size is normal. Pulmonary vasculature is normal. Lungs are clear. No pleural fluid. No acute disease.  Preliminary report per radiology.     Laboratory:  CBC: WNL (WBC 8.0, HGB 16.2, )  BMP: Glucose 104 high, o/w WNL (Creatinine 0.92)  D dimer quantitative: 0.4   Influenza A/B antigen: A negative, B negative      Interventions:  0832 NS 1,00 mL IV   0832 Lidocaine patch transdermal   0835 Duoneb 3 mL Nebulization    Emergency Department Course:  Nursing notes and vitals reviewed.  0806: I performed an exam of the patient as documented above.     1004: I updated and reassessed the patient. He did not feel that the breathing treatment helped much.     I personally reviewed the laboratory results with the patient and answered all related questions prior to discharge.     Findings and plan explained to the patient. Patient discharged home with instructions regarding supportive care, medications, and reasons to return. The importance of close follow-up was reviewed.     Impression & Plan      Medical Decision Making:  Luan is a 28-year-old man who presents with 3 days of cough.  He became concerned when he had a small amount of hemoptysis yesterday and today.  Further, he has some left posterior inferior chest pain that is pleuritic in nature.  He does report history of intermittent right upper extremity paresthesias and has developed these over the last couple of days on the left, though he currently has no paresthesias at this time.  He has not had a fever and he has no other concerns.  He appears quite well on exam though he is tachycardic.  His lungs are clear and he has reproducible tenderness in the area of pain in the left inferior posterior  chest.  Fortunately, EKG is reassuring with sinus tachycardia only.  Patient provided me a picture of the amount of hemoptysis he had which was a quite small amount.  I suspect this is related only to bronchitis or possibly his smoking history though a concern would be for pulmonary embolus.  Fortunately, there are no electrolyte abnormalities requiring attention and no kidney injury.  He has no leukocytosis or anemia and, fortunately, d-dimer is negative essentially ruling out pulmonary embolus.  Rapid influenza is negative though certainly this could be a false negative.  This seems less likely without the presence of fever.  Chest x-ray was obtained and reveals no acute pathology such as pneumonia, pleural effusion, or pulmonary edema.  Patient was given a DuoNeb which he states did not significantly change his cough or respiratory symptoms.  He was given a Lidoderm patch and refused Tylenol and states that he is continuing to have some aching in the area of his posterior chest pain.  However, given the reassuring workup today with this patient's constellation of symptoms I believe he most likely has a viral bronchitis.  I see no indication for antibiotics and have recommended supportive care going forward including fluids, rest, and over-the-counter treatments for cough and congestion.  We discussed Tylenol and Motrin for his back pain which is likely musculoskeletal as it is reproducible, though a viral-induced pleurisy remains on the differential. I provided him with a clinic for primary care follow-up later this week to ensure he is improving as expected though I provided strict return precautions in the interim.  All the patient's questions were answered and he verbalized understanding.  Amenable to discharge.    Diagnosis:    ICD-10-CM   1. Bronchitis J40   2. Tobacco abuse Z72.0   3. Pleuritic chest pain R07.81       Disposition:  Discharged home.       Gary YORK, am serving as a scribe at 8:06 AM  on 2/25/2019 to document services personally performed by Dr. Katarzyna Stafford, based on my observations and the provider's statements to me.    [facility] EMERGENCY DEPARTMENT       Katarzyna Stafford MD  02/28/19 [id number]

## 2019-02-25 NOTE — ED TRIAGE NOTES
Cough for 3 days, noted blood in sputum today.  Back pain started this AM.  Left arm numbness intermittently since yesterday.  ABCDs intact.

## 2019-02-25 NOTE — LETTER
February 25, 2019      To Whom It May Concern:      Luan Haynes was seen in our Emergency Department today, 02/25/19.  I expect his condition to improve over the next 2-3 days.  He may return to work when improved.    Sincerely,    Bessie Pugh RN

## 2019-02-28 ASSESSMENT — ENCOUNTER SYMPTOMS
NUMBNESS: 1
BACK PAIN: 1

## 2022-12-05 ENCOUNTER — APPOINTMENT (OUTPATIENT)
Dept: GENERAL RADIOLOGY | Facility: CLINIC | Age: 32
End: 2022-12-05
Attending: EMERGENCY MEDICINE
Payer: OTHER MISCELLANEOUS

## 2022-12-05 ENCOUNTER — HOSPITAL ENCOUNTER (EMERGENCY)
Facility: CLINIC | Age: 32
Discharge: HOME | End: 2022-12-05
Attending: EMERGENCY MEDICINE | Admitting: EMERGENCY MEDICINE
Payer: OTHER MISCELLANEOUS

## 2022-12-05 VITALS
HEART RATE: 88 BPM | RESPIRATION RATE: 20 BRPM | DIASTOLIC BLOOD PRESSURE: 86 MMHG | OXYGEN SATURATION: 99 % | SYSTOLIC BLOOD PRESSURE: 125 MMHG | TEMPERATURE: 98.4 F

## 2022-12-05 DIAGNOSIS — M54.50 ACUTE RIGHT-SIDED LOW BACK PAIN WITHOUT SCIATICA: ICD-10-CM

## 2022-12-05 PROCEDURE — 99284 EMERGENCY DEPT VISIT MOD MDM: CPT

## 2022-12-05 PROCEDURE — 72100 X-RAY EXAM L-S SPINE 2/3 VWS: CPT

## 2022-12-05 PROCEDURE — 250N000013 HC RX MED GY IP 250 OP 250 PS 637: Performed by: EMERGENCY MEDICINE

## 2022-12-05 RX ORDER — OXYCODONE HYDROCHLORIDE 5 MG/1
5 TABLET ORAL ONCE
Status: COMPLETED | OUTPATIENT
Start: 2022-12-05 | End: 2022-12-05

## 2022-12-05 RX ORDER — CYCLOBENZAPRINE HCL 10 MG
10 TABLET ORAL 3 TIMES DAILY PRN
Qty: 20 TABLET | Refills: 0 | Status: SHIPPED | OUTPATIENT
Start: 2022-12-05 | End: 2022-12-11

## 2022-12-05 RX ORDER — OXYCODONE HYDROCHLORIDE 5 MG/1
5 TABLET ORAL EVERY 6 HOURS PRN
Qty: 10 TABLET | Refills: 0 | Status: SHIPPED | OUTPATIENT
Start: 2022-12-05 | End: 2022-12-08

## 2022-12-05 RX ADMIN — OXYCODONE HYDROCHLORIDE 5 MG: 5 TABLET ORAL at 10:09

## 2022-12-05 ASSESSMENT — ACTIVITIES OF DAILY LIVING (ADL): ADLS_ACUITY_SCORE: 35

## 2022-12-05 NOTE — ED TRIAGE NOTES
Patient presents to the ED with right low back pain. States that he injured back while moving tires at work on Friday. Reports has been taking some of his wife's muscle relaxers over the weekend with no relief.

## 2022-12-05 NOTE — Clinical Note
Luan Haynes was seen and treated in our emergency department on 12/5/2022.  He may return to work on 12/12/2022.  If ongoing issues will need spine provider assessment to determine return to work as well as duties.       If you have any questions or concerns, please don't hesitate to call.      Bernarda Lebron MD

## 2022-12-05 NOTE — Clinical Note
Luan Haynes was seen and treated in our emergency department on 12/5/2022.  He may return to work on 12/12/2022.       If you have any questions or concerns, please don't hesitate to call.      Bernarda Lebron MD

## 2022-12-05 NOTE — DISCHARGE INSTRUCTIONS
Please see your PCP in 2-3 days for a recheck.  If you have increasing pain, loss of bowel or bladder function, numbness in your groin, unable to walk, fevers >101 or other acute changes, return to the ED.      Please use tylenol and ibuprofen for pain first, if having breakthrough pain then may try oxycodone but do not drive, operate heavy machinery or use other drugs/alcohol.  Opiates are a high risk medication so use caution.  Caution with using muscle relaxer ad opiates.     Discharge Instructions  Back Pain  You were seen today for back pain. Back pain can have many causes, but most will get better without surgery or other specific treatment. Sometimes there is a herniated ( slipped ) disc. We do not usually do MRI scans to look for these right away, since most herniated discs will get better on their own with time.  Today, we did not find any evidence that your back pain was caused by a serious condition. However, sometimes symptoms develop over time and cannot be found during an emergency visit, so it is very important that you follow up with your primary provider.  Generally, every Emergency Department visit should have a follow-up clinic visit with either a primary or a specialty clinic/provider. Please follow-up as instructed by your emergency provider today.    Return to the Emergency Department if:  You develop a fever with your back pain.   You have weakness or change in sensation in one or both legs.  You lose control of your bowels or bladder, or cannot empty your bladder (cannot pee).  Your pain gets much worse.     Follow-up with your provider:  Unless your pain has completely gone away, please make an appointment with your provider within one week. Most of the routine care for back pain is available in a clinic and not the Emergency Department. You may need further management of your back pain, such as more pain medication, imaging such as an X-ray or MRI, or physical therapy.    What can I do to  help myself?  Remain Active -- People are often afraid that they will hurt their back further or delay recovery by remaining active, but this is one of the best things you can do for your back. In fact, staying in bed for a long time to rest is not recommended. Studies have shown that people with low back pain recover faster when they remain active. Movement helps to bring blood flow to the muscles and relieve muscle spasms as well as preventing loss of muscle strength.  Heat -- Using a heating pad can help with low back pain during the first few weeks. Do not sleep with a heating pad, as you can be burned.   Pain medications - You may take a pain medication such as Tylenol  (acetaminophen), Advil , Motrin  (ibuprofen) or Aleve  (naproxen).  If you were given a prescription for medicine here today, be sure to read all of the information (including the package insert) that comes with your prescription.  This will include important information about the medicine, its side effects, and any warnings that you need to know about.  The pharmacist who fills the prescription can provide more information and answer questions you may have about the medicine.  If you have questions or concerns that the pharmacist cannot address, please call or return to the Emergency Department.   Remember that you can always come back to the Emergency Department if you are not able to see your regular provider in the amount of time listed above, if you get any new symptoms, or if there is anything that worries you.

## 2022-12-05 NOTE — ED PROVIDER NOTES
"  History   Chief Complaint:  Back Pain       The history is provided by the patient.      Luan Haynes is a 32 year old male with history of asthma and ADHD who presents with lower right sided back pain that started Friday and has not improved. He states that he was moving tires at work on Friday and must have twisted the wrong way because he had a sudden onset of pain. The patient has tried taking his wife's muscle relaxers but it has not helped to ease his pain. He is unable to stand or move because of the pain. His pain is also exacerbated by breathing, talking, or laying down. He states that he feels a little bit of numbness shooting down his right leg. When he coughs, he feels like he is going to lose control of his bowel movements. He denies any recent falls or trauma. The patient's daughter was sick with a fever and has had a \"slight cold,\" but is primarily concerned about his back pain. He denies that he is experiencing vomiting, diarrhea, chest pain, incontinence, or numbness in his groin. The patient denies using IV drugs, anticoagulants, or tobacco. He does use marijuana. He denies any pervious history of back issues or surgeries.    He has taken 2 unexpired muscle relaxes and Aleive today.    Review of Systems   All other systems reviewed and are negative.    Allergies:  No Known Allergies    Medications:  No current outpatient medications    Past Medical History:     Foot injury  Neuropathic pain  Posterior tibial tendinitis of left leg  Tendinitis of flexor hallucis longus  Asthma  Depression  ADHD    Past Surgical History:    Appendectomy  Hand surgery    Family History:    Materna grandmother: hypertension, lipids, CAD, diabetes    Social History:  The patient presents to the ED alone  Drug Use: marijuana  Occupation: manager involving [employer]  PCP: No Ref-Primary, Physician     Physical Exam     Patient Vitals for the past 24 hrs:   BP Temp Temp src Pulse Resp SpO2   12/05/22 1109 125/86 98.4  F " (36.9  C) Oral 88 20 99 %   22 0741 (!) 150/136 97.4  F (36.3  C) Temporal 101 18 99 %       Physical Exam  General: Resting on the bed.  Head: No obvious trauma to head.  Ears, Nose, Throat:  External ears normal.  Nose normal.    Eyes:  Conjunctivae clear.    CV: Regular rate and rhythm.  No murmurs.      Respiratory: Effort normal and breath sounds normal.  No wheezing or crackles.   Gastrointestinal: Soft.  No distension. There is no tenderness.    Musculoskeletal: Normal range of motion.  Non tender extremities to palpations.  Denies any thoracic pain to palpation.  There are some mild midline pain to palpation of the lumbar spine without step-off or deformity.  Also noted to have some mild right paraspinous muscle tenderness.  No SI joint pain.  Neuro: Alert. Moving all extremities appropriately.  No saddle anesthesia.  5/5 flexor strength, dorsi and plantar strength.  Sensation intact to light touch.  2+ patellar reflexes.  2+ DP pulses.  Normal gait.  Skin: Skin is warm and dry.  No rash noted.       Emergency Department Course     Imaging:  Lumbar spine XR, 2-3 views   Final Result   IMPRESSION: Five lumbar type vertebrae. Normal alignment. Vertebral   body heights normal. No fractures. No significant degenerative change.      CORINNE IGNACIO MD            SYSTEM ID:  [id number]        Report per radiology    Emergency Department Course:     Reviewed:  I reviewed nursing notes, vitals, past medical history and Care Everywhere    Assessments:  0934 I obtained history and examined the patient as noted above.   1115 I rechecked the patient and explained findings.     Interventions:  1009 Oxycodone 5 mg PO    Disposition:  The patient was discharged to home.     Impression & Plan     Medical Decision Makin-year-old male presents for evaluation of low back pain. Findings on exam and by history are most consistent with mechanical low back strain.  Broad differential was pursued including not limited to  fracture, subluxation, strain sprain, herniated disc, epidural abscess, epidural hematoma, discitis, cauda equina, etc.  Patient did have some midline tenderness.  This happened after twisting therefore opted to obtain x-rays.  X-rays were negative for acute fracture, subluxation etc.  No evidence at this time to suggest any type of acute cord compromise or cauda equina syndrome. The patient has a normal neurologic exam of the lower extremities.  No indication for emergent MRI at this time.  No red flags.  History and exam at this time does no support alternate cause of low back pain including but not limited to spinal epidural abscess or hematoma, discitis, spine fracture, or any serious referred acute intra-abdominal or genitourinary process. No urinary symptoms. The patient does not have a history of malignancy or IV drug use.  No fevers.  No blood thinner use.  Low suspicion for acute spinal cord emergency.  The patient was counseled regarding the need for supportive care and close follow up with primary care. Indications to seek urgent re-evaluation were reviewed including but not limited to change to urinary or bowel patterns, saddle numbness, leg weakness, worsening or no improvement in pain or for any other questions or concerns. A prescriptions for Flexeril, Tylenol, ibuprofen, as needed oxycodone  was provided.  Counseled on minimization of narcotic use.  Early mobilization and range of motion exercises were also discussed. An understanding of the discharge instructions and need for follow up were verbally confirmed.    Diagnosis:    ICD-10-CM    1. Acute right-sided low back pain without sciatica  M54.50           Discharge Medications:  New Prescriptions    CYCLOBENZAPRINE (FLEXERIL) 10 MG TABLET    Take 1 tablet (10 mg) by mouth 3 times daily as needed for muscle spasms    OXYCODONE (ROXICODONE) 5 MG TABLET    Take 1 tablet (5 mg) by mouth every 6 hours as needed for pain       Scribe Disclosure:  I  Misael Mendes, am serving as a scribe at 9:34 AM on 12/5/2022 to document services personally performed by Bernarda Lebron MD based on my observations and the provider's statements to me.       Bernarda Lebron MD  12/05/22 [id number]

## 2023-09-25 ENCOUNTER — OFFICE VISIT (OUTPATIENT)
Dept: URGENT CARE | Facility: URGENT CARE | Age: 33
End: 2023-09-25
Payer: COMMERCIAL

## 2023-09-25 VITALS
OXYGEN SATURATION: 98 % | HEART RATE: 105 BPM | SYSTOLIC BLOOD PRESSURE: 140 MMHG | RESPIRATION RATE: 18 BRPM | TEMPERATURE: 98.1 F | DIASTOLIC BLOOD PRESSURE: 82 MMHG

## 2023-09-25 DIAGNOSIS — B96.89 ACUTE BACTERIAL BRONCHITIS: Primary | ICD-10-CM

## 2023-09-25 DIAGNOSIS — R05.9 COUGH, UNSPECIFIED TYPE: ICD-10-CM

## 2023-09-25 DIAGNOSIS — J20.8 ACUTE BACTERIAL BRONCHITIS: Primary | ICD-10-CM

## 2023-09-25 DIAGNOSIS — J98.01 BRONCHOSPASM: ICD-10-CM

## 2023-09-25 DIAGNOSIS — Z72.0 TOBACCO ABUSE: ICD-10-CM

## 2023-09-25 PROCEDURE — 99204 OFFICE O/P NEW MOD 45 MIN: CPT | Performed by: PHYSICIAN ASSISTANT

## 2023-09-25 RX ORDER — BENZONATATE 200 MG/1
200 CAPSULE ORAL 3 TIMES DAILY PRN
Qty: 21 CAPSULE | Refills: 0 | Status: SHIPPED | OUTPATIENT
Start: 2023-09-25 | End: 2023-10-02

## 2023-09-25 RX ORDER — PREDNISONE 20 MG/1
20 TABLET ORAL 2 TIMES DAILY
Qty: 10 TABLET | Refills: 0 | Status: SHIPPED | OUTPATIENT
Start: 2023-09-25

## 2023-09-25 RX ORDER — AMOXICILLIN 875 MG
875 TABLET ORAL 2 TIMES DAILY
Qty: 20 TABLET | Refills: 0 | Status: SHIPPED | OUTPATIENT
Start: 2023-09-25 | End: 2023-10-05

## 2023-09-25 RX ORDER — CODEINE PHOSPHATE AND GUAIFENESIN 10; 100 MG/5ML; MG/5ML
1-2 SOLUTION ORAL
Qty: 118 ML | Refills: 0 | Status: SHIPPED | OUTPATIENT
Start: 2023-09-25

## 2023-09-25 RX ORDER — AZITHROMYCIN 250 MG/1
TABLET, FILM COATED ORAL
Qty: 6 TABLET | Refills: 0 | Status: SHIPPED | OUTPATIENT
Start: 2023-09-25 | End: 2023-09-30

## 2023-09-25 RX ORDER — ALBUTEROL SULFATE 90 UG/1
2 AEROSOL, METERED RESPIRATORY (INHALATION) EVERY 6 HOURS
Qty: 8.5 G | Refills: 0 | Status: SHIPPED | OUTPATIENT
Start: 2023-09-25

## 2023-09-25 NOTE — LETTER
September 25, 2023      Luan Haynes  [address] [place] [address]        To Whom It May Concern:    Luan Haynes  was seen on 9/25/23.  Please excuse him from work tomorrow.     Sincerely,        Wilbert Apple, [facility], PA-C

## 2023-09-26 ENCOUNTER — TELEPHONE (OUTPATIENT)
Dept: INTERNAL MEDICINE | Facility: CLINIC | Age: 33
End: 2023-09-26
Payer: COMMERCIAL

## 2023-09-26 ENCOUNTER — TELEPHONE (OUTPATIENT)
Dept: URGENT CARE | Facility: URGENT CARE | Age: 33
End: 2023-09-26
Payer: COMMERCIAL

## 2023-09-26 NOTE — TELEPHONE ENCOUNTER
"  General Call      Reason for Call:  Patient was at the urgent care and was seen by Wilbert Apple PA on 9/25/23. He is needing a letter for work excusing him from work on 9/26/23 and 9/27/23. Please send letter to patient's email is \" [email or web address] \"    What are your questions or concerns:  Letter for work    Date of last appointment with provider: 9/25/23    Okay to leave a detailed message?: Yes at Cell number on file:    Telephone Information:   Mobile [phone]       "

## 2023-09-26 NOTE — PROGRESS NOTES
Assessment & Plan     Acute bacterial bronchitis    Bronchitis is inflammation of the bronchial tubes, which carry air to the lungs. The tubes swell and produce mucus, or phlegm. The mucus and inflamed bronchial tubes make you cough. You may have trouble breathing.  Most cases of bronchitis are caused by viruses but in your case we are more concerned about a bacterial infection. . Antibiotics usually are helpful in this situation to help you clear this bacterial infection.  Bronchitis usually develops rapidly and lasts about 2 to 3 weeks in otherwise healthy people.    Patient is a smoker and there is concern for development of pneumonia    - azithromycin (ZITHROMAX) 250 MG tablet; Take 2 tablets (500 mg) by mouth daily for 1 day, THEN 1 tablet (250 mg) daily for 4 days.  - amoxicillin (AMOXIL) 875 MG tablet; Take 1 tablet (875 mg) by mouth 2 times daily for 10 days    Cough, unspecified type    Tessalon for daytime cough  Robitussin ac for night time cough  - benzonatate (TESSALON) 200 MG capsule; Take 1 capsule (200 mg) by mouth 3 times daily as needed for cough  - guaiFENesin-codeine (ROBITUSSIN AC) 100-10 MG/5ML solution; Take 5-10 mLs by mouth nightly as needed for cough    Bronchospasm    Albuterol and prednisone for bronchospasms  - predniSONE (DELTASONE) 20 MG tablet; Take 1 tablet (20 mg) by mouth 2 times daily    Tobacco abuse    Albuterol for wheezing  - albuterol (PROVENTIL HFA) 108 (90 Base) MCG/ACT inhaler; Inhale 2 puffs into the lungs every 6 hours    Review of external notes as documented elsewhere in note       Nicotine/Tobacco Cessation:  He reports that he has been smoking. He uses smokeless tobacco.  Nicotine/Tobacco Cessation Plan:   Information offered: Patient not interested at this time    At today's visit with Luan Haynes , we discussed results, diagnosis, medications and formulated a plan.  We also discussed red flags for immediate return to clinic/ER, as well as indications for follow  up with PCP if not improved in 3 days. Patient understood and agreed to plan. Luan Haynes was discharged with stable vitals and has no further questions.       No follow-ups on file.    Wilbert Apple, [facility], PA-C  [facility] URGENT [facility]    Zamzam Roland is a 33 year old, presenting for the following health issues:  Cough (Cough, chest congestion X 1 week )      HPI     Review of Systems   Constitutional, HEENT, cardiovascular, pulmonary, GI, , musculoskeletal, neuro, skin, endocrine and psych systems are negative, except as otherwise noted.      Objective    BP (!) 140/82   Pulse 105   Temp 98.1  F (36.7  C) (Tympanic)   Resp 18   SpO2 98%   There is no height or weight on file to calculate BMI.  Physical Exam   GENERAL: healthy, alert and no distress  EYES: Eyes grossly normal to inspection, PERRL and conjunctivae and sclerae normal  HENT: ear canals and TM's normal, nose and mouth without ulcers or lesions  NECK: no adenopathy, no asymmetry, masses, or scars and thyroid normal to palpation  RESP: rhonchi throughout and expiratory wheezes throughout  CV: regular rate and rhythm, normal S1 S2, no S3 or S4, no murmur, click or rub, no peripheral edema and peripheral pulses strong  MS: no gross musculoskeletal defects noted, no edema  SKIN: no suspicious lesions or rashes  NEURO: Normal strength and tone, mentation intact and speech normal  PSYCH: mentation appears normal, affect normal/bright

## 2023-09-26 NOTE — TELEPHONE ENCOUNTER
Patient Returning Call    Reason for call:  patient was seen at  yesterday ands needs a doctors note for his employment because he will not be at work for 9/26 and 9/27. Patient will set up a my chart and is ok with the note being sent to him Via [employer]    Information relayed to patient:      Patient has additional questions:  No      Okay to leave a detailed message?: Yes at Cell number on file:    Telephone Information:   Mobile [phone]

## 2023-09-26 NOTE — LETTER
September 28, 2023      Luan Haynse  [address] [place] [address]        To Whom It May Concern:    Luan Haynes was seen in our clinic 9/25/23. He may return to work without restrictions on 9/28/23      Sincerely,      Navjot Kapoor MD

## 2023-09-26 NOTE — LETTER
September 26, 2023      Luan Haynes  [address] [place] [address]        To Whom It May Concern:    Luan Haynes  was seen on 9/25/23.  Please excuse him  until 9/28/23 due to illness.        Sincerely,        Wilbert Apple, [facility], PA-C

## 2023-11-26 ENCOUNTER — HEALTH MAINTENANCE LETTER (OUTPATIENT)
Age: 33
End: 2023-11-26

## 2024-01-09 ENCOUNTER — NURSE TRIAGE (OUTPATIENT)
Dept: NURSING | Facility: CLINIC | Age: 34
End: 2024-01-09
Payer: COMMERCIAL

## 2024-01-09 NOTE — TELEPHONE ENCOUNTER
"Requesting to schedule PCR COVID test. Does not have PCP here. Has taken 2 positive home COVID Tests today. This writer explained to patient that he would need a visit and that \"virtual visit\" is option. Patient declines visit and declines triage. Thanked writer and hung up.     MAYELA DAMON, RN  [facility] nurse advisors  1/9/2024  3:55 PM  Reason for Disposition   General information question, no triage required and triager able to answer question    Additional Information   Negative: New-onset or worsening symptoms, see that protocol (e.g., diarrhea, runny nose, sore throat)   Negative: Medicine question not related to refill or renewal   Negative: Requesting a renewal or refill of a medicine patient is currently taking   Negative: Questions or concerns about high blood pressure   Negative: Nursing judgment   Negative: Nursing judgment   Negative: Nursing judgment   Negative: Requesting lab results and adult stable (no new symptoms, not worsening)   Negative: Requesting referral to a specialist   Negative: Questions about durable medical equipment ordered and triager unable to answer   Negative: Requesting regular office appointment and adult stable (no new symptoms, not worsening)   Negative: Health information question, no triage required and triager able to answer question    Protocols used: Information Only Call - No Triage-A-[place]    "

## 2025-01-04 ENCOUNTER — HEALTH MAINTENANCE LETTER (OUTPATIENT)
Age: 35
End: 2025-01-04